# Patient Record
Sex: FEMALE | Race: WHITE | NOT HISPANIC OR LATINO | Employment: FULL TIME
[De-identification: names, ages, dates, MRNs, and addresses within clinical notes are randomized per-mention and may not be internally consistent; named-entity substitution may affect disease eponyms.]

---

## 2017-09-30 ENCOUNTER — HEALTH MAINTENANCE LETTER (OUTPATIENT)
Age: 47
End: 2017-09-30

## 2019-04-17 ENCOUNTER — OFFICE VISIT (OUTPATIENT)
Dept: FAMILY MEDICINE | Facility: CLINIC | Age: 49
End: 2019-04-17
Payer: COMMERCIAL

## 2019-04-17 VITALS
RESPIRATION RATE: 16 BRPM | HEART RATE: 79 BPM | WEIGHT: 152 LBS | TEMPERATURE: 98.7 F | HEIGHT: 65 IN | BODY MASS INDEX: 25.33 KG/M2 | SYSTOLIC BLOOD PRESSURE: 110 MMHG | DIASTOLIC BLOOD PRESSURE: 80 MMHG | OXYGEN SATURATION: 98 %

## 2019-04-17 DIAGNOSIS — Z12.31 VISIT FOR SCREENING MAMMOGRAM: ICD-10-CM

## 2019-04-17 DIAGNOSIS — Z00.00 ENCOUNTER FOR ROUTINE ADULT HEALTH EXAMINATION WITHOUT ABNORMAL FINDINGS: Primary | ICD-10-CM

## 2019-04-17 DIAGNOSIS — Z01.84 IMMUNITY STATUS TESTING: ICD-10-CM

## 2019-04-17 DIAGNOSIS — R07.89 CHEST TIGHTNESS: ICD-10-CM

## 2019-04-17 DIAGNOSIS — R19.5 LOOSE STOOLS: ICD-10-CM

## 2019-04-17 DIAGNOSIS — N92.0 SPOTTING: ICD-10-CM

## 2019-04-17 DIAGNOSIS — M79.671 PAIN IN BOTH FEET: ICD-10-CM

## 2019-04-17 DIAGNOSIS — Z11.4 SCREENING FOR HIV (HUMAN IMMUNODEFICIENCY VIRUS): ICD-10-CM

## 2019-04-17 DIAGNOSIS — M79.672 PAIN IN BOTH FEET: ICD-10-CM

## 2019-04-17 DIAGNOSIS — Z83.79 FAMILY HISTORY OF CELIAC DISEASE: ICD-10-CM

## 2019-04-17 DIAGNOSIS — Z23 NEED FOR PROPHYLACTIC VACCINATION WITH TETANUS-DIPHTHERIA (TD): ICD-10-CM

## 2019-04-17 PROCEDURE — 90715 TDAP VACCINE 7 YRS/> IM: CPT | Performed by: FAMILY MEDICINE

## 2019-04-17 PROCEDURE — 90471 IMMUNIZATION ADMIN: CPT | Performed by: FAMILY MEDICINE

## 2019-04-17 PROCEDURE — 99214 OFFICE O/P EST MOD 30 MIN: CPT | Mod: 25 | Performed by: FAMILY MEDICINE

## 2019-04-17 PROCEDURE — 99386 PREV VISIT NEW AGE 40-64: CPT | Mod: 25 | Performed by: FAMILY MEDICINE

## 2019-04-17 PROCEDURE — 93000 ELECTROCARDIOGRAM COMPLETE: CPT | Performed by: FAMILY MEDICINE

## 2019-04-17 RX ORDER — PIMECROLIMUS 1 %
CREAM (GRAM) TOPICAL
Refills: 0 | COMMUNITY
Start: 2019-04-08 | End: 2019-05-08

## 2019-04-17 ASSESSMENT — MIFFLIN-ST. JEOR: SCORE: 1322.41

## 2019-04-17 NOTE — NURSING NOTE
Screening Questionnaire for Adult Immunization    Are you sick today?   No   Do you have allergies to medications, food, a vaccine component or latex?   No   Have you ever had a serious reaction after receiving a vaccination?   No   Do you have a long-term health problem with heart disease, lung disease, asthma, kidney disease, metabolic disease (e.g. diabetes), anemia, or other blood disorder?   No   Do you have cancer, leukemia, HIV/AIDS, or any other immune system problem?   No   In the past 3 months, have you taken medications that affect  your immune system, such as prednisone, other steroids, or anticancer drugs; drugs for the treatment of rheumatoid arthritis, Crohn s disease, or psoriasis; or have you had radiation treatments?   No   Have you had a seizure, or a brain or other nervous system problem?   No   During the past year, have you received a transfusion of blood or blood     products, or been given immune (gamma) globulin or antiviral drug?   No   For women: Are you pregnant or is there a chance you could become        pregnant during the next month?   No   Have you received any vaccinations in the past 4 weeks?   No     Immunization questionnaire answers were all negative.        Patient instructed to remain in clinic for 15 minutes afterwards, and to report any adverse reaction to me immediately.       Screening performed by Dax Javier on 4/17/2019 at 1:56 PM.

## 2019-04-17 NOTE — PATIENT INSTRUCTIONS
Schedule a lab only visit for fasting labs. You need to be fasting for 10-12 hours.     Please call [facility] (formerly called [facility]) at [phone] to schedule your mammogram. Check to see if your insurance will cover the 3D screening.     The goal for calcium is 1000 mg per day. The calcium in one serving of dairy is about 300 mg. Add up how much calcium you are getting in your diet and supplement the rest.     I recommend [place] take an over-the-counter Vitamin D supplement because we do not get enough sun. Take 1000 international units daily.     Preventive Health Recommendations  Female Ages 40 to 49    Yearly exam:     See your health care provider every year in order to  1. Review health changes.   2. Discuss preventive care.    3. Review your medicines if your doctor prescribed any.      Get a Pap test every three years (unless you have an abnormal result and your provider advises testing more often).      If you get Pap tests with HPV test, you only need to test every 5 years, unless you have an abnormal result. You do not need a Pap test if your uterus was removed (hysterectomy) and you have not had cancer.      You should be tested each year for STDs (sexually transmitted diseases), if you're at risk.     Ask your doctor if you should have a mammogram.      Have a colonoscopy (test for colon cancer) if someone in your family has had colon cancer or polyps before age 50.       Have a cholesterol test every 5 years.       Have a diabetes test (fasting glucose) after age 45. If you are at risk for diabetes, you should have this test every 3 years.    Shots: Get a flu shot each year. Get a tetanus shot every 10 years.     Nutrition:     Eat at least 5 servings of fruits and vegetables each day.    Eat whole-grain bread, whole-wheat pasta and brown rice instead of white grains and rice.    Get adequate Calcium and Vitamin D.      Lifestyle    Exercise  at least 150 minutes a week (an average of 30 minutes a day, 5 days a week). This will help you control your weight and prevent disease.    Limit alcohol to one drink per day.    No smoking.     Wear sunscreen to prevent skin cancer.    See your dentist every six months for an exam and cleaning.

## 2019-04-17 NOTE — LETTER
April 19, 2019      Shireen Bautista  [address] [address]        Dear Shireen,     It was nice to see you in the office recently. I was reviewing your chart after our visit and can't remember if we came to a conclusion on the recent chest pain you were having. The EKG looked great, but if you are continuing to note exertional based pain it would be important to complete a stress test to ensure the pain is not cardiac related.     I've place an order for a stress echo. Please schedule this if you continue to note any exertional pain. This test has you exercise on a recumbent bike and an EKG and echo (heart ultrasound) are completed as you do this to see how your heart responds to stress. The test takes about an hour and you'll need work-out clothes and tennis shoes. Please call [facility] (formerly called [facility]) at [phone] to schedule this test.    Please let me know if you have questions.        Sincerely,        Jaqui Barlow MD

## 2019-04-17 NOTE — PROGRESS NOTES
SUBJECTIVE:   CC: Shireen Bautista is an 48 year old woman who presents for preventive health visit.     Healthy Habits:    Do you get at least three servings of calcium containing foods daily (dairy, green leafy vegetables, etc.)? yes    Amount of exercise or daily activities, outside of work: 0 day(s) per week    Problems taking medications regularly No    Medication side effects: No    Have you had an eye exam in the past two years? yes    Do you see a dentist twice per year? yes    Do you have sleep apnea, excessive snoring or daytime drowsiness?no    Chest tightness:  Patient complains of occasional chest tightness/soreness in central chest. She previously noticed it every few months but it has been more lately. She thought it might be related to caffeine, but it happened the other day when she wasn't drinking caffeine. She had previously been under a lot of work stress but finished a couple projects at work two weeks ago so her stress is reduced.     She is not sure if it is related to eating, but she does experience heart burn occasionally. She thinks she sometimes notices it with exertion because she is deconditioned and does not exercise regularly, but she is not sure she has exerted herself enough to truly determine if it is exertional.     She notices some tachycardia but denies palpitations, wheezing, coughing. No history of asthma     GI:  -After eating fried foods patient has diarrhea, but notes throughout her life she has had loose stools. She has not had any abdominal pain or discomfort  -Patient's daughter has Celiac disease but patient had a negative test   -Complains of rectal skin tags that have become more irritating overtime. She thinks they started during pregnancy      Menstrual cycle:  -Patient's periods are pretty regular, but she has more spotting after her period than she used to. She has up to 10 days of light spotting  -For contraception she had a tubal ligation with her last       Foot pain:  Patient complains of pain in her feet when walking barefoot and thinks she has plantar fasciitis. As she has gotten older she has pain in her bilateral heels when walking long distances. She would like to see podiatry.       Today's PHQ-2 Score:   PHQ-2 (  [employer]) 2019   Q1: Little interest or pleasure in doing things 0 0   Q2: Feeling down, depressed or hopeless 0 0   PHQ-2 Score 0 0       Abuse: Current or Past(Physical, Sexual or Emotional)- No  Do you feel safe in your environment? Yes    Social History     Tobacco Use     Smoking status: Never Smoker     Smokeless tobacco: Never Used   Substance Use Topics     Alcohol use: Yes     If you drink alcohol do you typically have >3 drinks per day or >7 drinks per week? 2 drinks weekly                      Reviewed orders with patient.  Reviewed health maintenance and updated orders accordingly - Yes  Patient Active Problem List   Diagnosis     AD (atopic dermatitis)     Past Surgical History:   Procedure Laterality Date     ABDOMINOPLASTY  2012      SECTION  , ,      LAPAROSCOPY DIAGNOSTIC (GENERAL)  2004, ?2007    endmetriosis       Social History     Tobacco Use     Smoking status: Never Smoker     Smokeless tobacco: Never Used   Substance Use Topics     Alcohol use: Yes     Family History   Problem Relation Age of Onset     Diabetes Father      Lipids Father      Cardiovascular Maternal Grandmother         CHF     Unknown/Adopted Maternal Grandfather      Unknown/Adopted Paternal Grandfather      Lipids Mother      Hypertension Mother      Hypertension Brother      Asthma No family hx of      C.A.D. No family hx of      Thyroid Disease No family hx of            Mammogram Screening: Patient under age 50, mutual decision reflected in health maintenance.      Pertinent mammograms are reviewed under the imaging tab.  History of abnormal Pap smear: NO - age 30-65 PAP every 5 years with negative HPV  "co-testing recommended  PAP / HPV 7/30/2014   PAP NIL     Reviewed and updated as needed this visit by clinical staff  Tobacco  Allergies  Meds         Reviewed and updated as needed this visit by Provider            ROS:   ROS: 10 point ROS neg other than the symptoms noted above in the HPI.    This document serves as a record of the services and decisions personally performed by SUE ALTAMIRANO. It was created on his/her behalf by Deanna Petit, a trained medical scribe. The creation of this document is based on the provider's statements to the medical scribe. Deanna Petit, April 17, 2019 12:56 PM  OBJECTIVE:   /80 (BP Location: Right arm, Patient Position: Sitting, Cuff Size: Adult Regular)   Pulse 79   Temp 98.7  F (37.1  C) (Oral)   Resp 16   Ht 1.638 m (5' 4.5\")   Wt 68.9 kg (152 lb)   SpO2 98%   BMI 25.69 kg/m    EXAM:  GENERAL: healthy, alert and no distress  EYES: Eyes grossly normal to inspection, PERRL and conjunctivae and sclerae normal  HENT: ear canals and TM's normal, nose and mouth without ulcers or lesions  NECK: no adenopathy, no asymmetry, masses, or scars and thyroid normal to palpation  RESP: lungs clear to auscultation - no rales, rhonchi or wheezes  BREAST: deferred, patient has upcoming visit with OB/GYN  CV: regular rate and rhythm, normal S1 S2, no S3 or S4, no murmur, click or rub, no peripheral edema and peripheral pulses strong  ABDOMEN: soft, nontender, no hepatosplenomegaly, no masses and bowel sounds normal   (female): deferred, patient has upcoming visit with OB/GYN  MS: no gross musculoskeletal defects noted, no edema  SKIN: no suspicious lesions or rashes  NEURO: Normal strength and tone, mentation intact and speech normal  PSYCH: mentation appears normal, affect normal/bright    EKG - appears normal, NSR, normal axis, normal intervals, no acute ST/T changes c/w ischemia, no LVH by voltage criteria, there are no prior tracings " "available  ASSESSMENT/PLAN:   1. Encounter for routine adult health examination without abnormal findings  - GLUCOSE; Future  - Lipid panel reflex to direct LDL Fasting; Future    2. Visit for screening mammogram  - MA SCREENING DIGITAL BILAT - Future  (s+30); Future    3. Screening for HIV (human immunodeficiency virus)  - HIV Screening; Future    4. Need for prophylactic vaccination with tetanus-diphtheria (Td)    5. Spotting  Instructed patient to discuss with OB/GYN at upcoming appt  - CBC with platelets differential; Future  - Ferritin; Future  - TSH with free T4 reflex; Future    6. Immunity status testing  - Hepatitis B Surface Antibody; Future    7. Loose stools  8. Family history of celiac disease  Given family history and persistence of loose stools, would like patient to see GI   - GASTROENTEROLOGY ADULT REF CONSULT ONLY  - **Comprehensive metabolic panel FUTURE anytime; Future    9. Chest tightness  Unclear etiology of chest pain. Some ? Of exertional component but patient is not sure. ekg is unremarkable. Needs stress testing if exertional pain is noted. Referral placed.   - EKG 12-lead complete w/read - Clinics    10. Pain in both feet  Patient will schedule with podiatry if pain is not improving with supportive shoes and home stretches  - ORTHO  REFERRAL    COUNSELING:   Reviewed preventive health counseling, as reflected in patient instructions  Special attention given to:        Regular exercise       Healthy diet/nutrition       Vision screening       Hearing screening       Alcohol Use       Contraception       Family planning       Colon cancer screening       HIV screeninx in teen years, 1x in adult years, and at intervals if high risk    BP Readings from Last 1 Encounters:   19 110/80     Estimated body mass index is 25.69 kg/m  as calculated from the following:    Height as of this encounter: 1.638 m (5' 4.5\").    Weight as of this encounter: 68.9 kg (152 lb).      Weight " management plan: Discussed healthy diet and exercise guidelines     reports that she has never smoked. She has never used smokeless tobacco.    Patient Instructions   Schedule a lab only visit for fasting labs. You need to be fasting for 10-12 hours.     Please call [facility] (formerly called [facility]) at [phone] to schedule your mammogram. Check to see if your insurance will cover the 3D screening.     The goal for calcium is 1000 mg per day. The calcium in one serving of dairy is about 300 mg. Add up how much calcium you are getting in your diet and supplement the rest.     I recommend [facility] take an over-the-counter Vitamin D supplement because we do not get enough sun. Take 1000 international units daily.     Preventive Health Recommendations  Female Ages 40 to 49    Yearly exam:     See your health care provider every year in order to  1. Review health changes.   2. Discuss preventive care.    3. Review your medicines if your doctor prescribed any.      Get a Pap test every three years (unless you have an abnormal result and your provider advises testing more often).      If you get Pap tests with HPV test, you only need to test every 5 years, unless you have an abnormal result. You do not need a Pap test if your uterus was removed (hysterectomy) and you have not had cancer.      You should be tested each year for STDs (sexually transmitted diseases), if you're at risk.     Ask your doctor if you should have a mammogram.      Have a colonoscopy (test for colon cancer) if someone in your family has had colon cancer or polyps before age 50.       Have a cholesterol test every 5 years.       Have a diabetes test (fasting glucose) after age 45. If you are at risk for diabetes, you should have this test every 3 years.    Shots: Get a flu shot each year. Get a tetanus shot every 10 years.     Nutrition:     Eat at least 5 servings of fruits and vegetables  each day.    Eat whole-grain bread, whole-wheat pasta and brown rice instead of white grains and rice.    Get adequate Calcium and Vitamin D.      Lifestyle    Exercise at least 150 minutes a week (an average of 30 minutes a day, 5 days a week). This will help you control your weight and prevent disease.    Limit alcohol to one drink per day.    No smoking.     Wear sunscreen to prevent skin cancer.    See your dentist every six months for an exam and cleaning.      Counseling Resources:  ATP IV Guidelines  Pooled Cohorts Equation Calculator  Breast Cancer Risk Calculator  FRAX Risk Assessment  ICSI Preventive Guidelines  Dietary Guidelines for Americans, 2010  USDA's MyPlate  ASA Prophylaxis  Lung CA Screening    The information in this document, created by the medical scribe for me, accurately reflects the services I personally performed and the decisions made by me. I have reviewed and approved this document for accuracy.   Jaqui Barlow MD  [facility]

## 2019-04-29 ENCOUNTER — TELEPHONE (OUTPATIENT)
Dept: PODIATRY | Facility: CLINIC | Age: 49
End: 2019-04-29

## 2019-04-29 NOTE — TELEPHONE ENCOUNTER
4/29 called and left voicemail. Explained Dr. Kelly will not be in the clinic wed may 1st please call [phone] to reschedule this appointment.     Maria Alejandra Palma          Procedure    Ortho/Sports Med/Ent/Eye   [facility]   [phone]

## 2019-05-08 ENCOUNTER — ANESTHESIA (OUTPATIENT)
Dept: SURGERY | Facility: CLINIC | Age: 49
End: 2019-05-08
Payer: COMMERCIAL

## 2019-05-08 ENCOUNTER — ANESTHESIA EVENT (OUTPATIENT)
Dept: SURGERY | Facility: CLINIC | Age: 49
End: 2019-05-08
Payer: COMMERCIAL

## 2019-05-08 ENCOUNTER — HOSPITAL ENCOUNTER (OUTPATIENT)
Facility: CLINIC | Age: 49
Discharge: HOME | End: 2019-05-09
Attending: EMERGENCY MEDICINE | Admitting: SURGERY
Payer: COMMERCIAL

## 2019-05-08 ENCOUNTER — APPOINTMENT (OUTPATIENT)
Dept: ULTRASOUND IMAGING | Facility: CLINIC | Age: 49
End: 2019-05-08
Attending: EMERGENCY MEDICINE
Payer: COMMERCIAL

## 2019-05-08 DIAGNOSIS — G89.18 ACUTE POST-OPERATIVE PAIN: Primary | ICD-10-CM

## 2019-05-08 DIAGNOSIS — K81.0 ACUTE CHOLECYSTITIS: ICD-10-CM

## 2019-05-08 PROBLEM — K81.9 CHOLECYSTITIS: Status: ACTIVE | Noted: 2019-05-08

## 2019-05-08 LAB
ALBUMIN SERPL-MCNC: 4 G/DL (ref 3.4–5)
ALBUMIN UR-MCNC: 30 MG/DL
ALP SERPL-CCNC: 81 U/L (ref 40–150)
ALT SERPL W P-5'-P-CCNC: 86 U/L (ref 0–50)
ANION GAP SERPL CALCULATED.3IONS-SCNC: 9 MMOL/L (ref 3–14)
APPEARANCE UR: ABNORMAL
AST SERPL W P-5'-P-CCNC: 49 U/L (ref 0–45)
BACTERIA #/AREA URNS HPF: ABNORMAL /HPF
BASOPHILS # BLD AUTO: 0 10E9/L (ref 0–0.2)
BASOPHILS NFR BLD AUTO: 0.1 %
BILIRUB SERPL-MCNC: 0.8 MG/DL (ref 0.2–1.3)
BILIRUB UR QL STRIP: NEGATIVE
BUN SERPL-MCNC: 11 MG/DL (ref 7–30)
CALCIUM SERPL-MCNC: 9.2 MG/DL (ref 8.5–10.1)
CHLORIDE SERPL-SCNC: 107 MMOL/L (ref 94–109)
CO2 SERPL-SCNC: 23 MMOL/L (ref 20–32)
COLOR UR AUTO: YELLOW
CREAT SERPL-MCNC: 0.9 MG/DL (ref 0.52–1.04)
DIFFERENTIAL METHOD BLD: NORMAL
EOSINOPHIL # BLD AUTO: 0.1 10E9/L (ref 0–0.7)
EOSINOPHIL NFR BLD AUTO: 1.5 %
ERYTHROCYTE [DISTWIDTH] IN BLOOD BY AUTOMATED COUNT: 13.2 % (ref 10–15)
GFR SERPL CREATININE-BSD FRML MDRD: 76 ML/MIN/{1.73_M2}
GLUCOSE SERPL-MCNC: 87 MG/DL (ref 70–99)
GLUCOSE UR STRIP-MCNC: NEGATIVE MG/DL
HCG UR QL: NEGATIVE
HCT VFR BLD AUTO: 40.1 % (ref 35–47)
HGB BLD-MCNC: 13.4 G/DL (ref 11.7–15.7)
HGB UR QL STRIP: NEGATIVE
IMM GRANULOCYTES # BLD: 0 10E9/L (ref 0–0.4)
IMM GRANULOCYTES NFR BLD: 0.4 %
KETONES UR STRIP-MCNC: NEGATIVE MG/DL
LEUKOCYTE ESTERASE UR QL STRIP: NEGATIVE
LIPASE SERPL-CCNC: 82 U/L (ref 73–393)
LYMPHOCYTES # BLD AUTO: 1.1 10E9/L (ref 0.8–5.3)
LYMPHOCYTES NFR BLD AUTO: 13.6 %
MCH RBC QN AUTO: 30.4 PG (ref 26.5–33)
MCHC RBC AUTO-ENTMCNC: 33.4 G/DL (ref 31.5–36.5)
MCV RBC AUTO: 91 FL (ref 78–100)
MONOCYTES # BLD AUTO: 0.5 10E9/L (ref 0–1.3)
MONOCYTES NFR BLD AUTO: 6 %
MUCOUS THREADS #/AREA URNS LPF: PRESENT /LPF
NEUTROPHILS # BLD AUTO: 6.3 10E9/L (ref 1.6–8.3)
NEUTROPHILS NFR BLD AUTO: 78.4 %
NITRATE UR QL: NEGATIVE
NRBC # BLD AUTO: 0 10*3/UL
NRBC BLD AUTO-RTO: 0 /100
PH UR STRIP: 5.5 PH (ref 5–7)
PLATELET # BLD AUTO: 249 10E9/L (ref 150–450)
POTASSIUM SERPL-SCNC: 3.5 MMOL/L (ref 3.4–5.3)
PROT SERPL-MCNC: 7.5 G/DL (ref 6.8–8.8)
RBC # BLD AUTO: 4.41 10E12/L (ref 3.8–5.2)
RBC #/AREA URNS AUTO: 1 /HPF (ref 0–2)
SODIUM SERPL-SCNC: 139 MMOL/L (ref 133–144)
SOURCE: ABNORMAL
SP GR UR STRIP: 1.02 (ref 1–1.03)
SQUAMOUS #/AREA URNS AUTO: 4 /HPF (ref 0–1)
UROBILINOGEN UR STRIP-MCNC: NORMAL MG/DL (ref 0–2)
WBC # BLD AUTO: 8 10E9/L (ref 4–11)
WBC #/AREA URNS AUTO: 3 /HPF (ref 0–5)

## 2019-05-08 PROCEDURE — 88304 TISSUE EXAM BY PATHOLOGIST: CPT | Performed by: SURGERY

## 2019-05-08 PROCEDURE — 25000125 ZZHC RX 250: Performed by: NURSE ANESTHETIST, CERTIFIED REGISTERED

## 2019-05-08 PROCEDURE — G0378 HOSPITAL OBSERVATION PER HR: HCPCS

## 2019-05-08 PROCEDURE — 36000058 ZZH SURGERY LEVEL 3 EA 15 ADDTL MIN: Performed by: SURGERY

## 2019-05-08 PROCEDURE — 25800030 ZZH RX IP 258 OP 636: Performed by: NURSE ANESTHETIST, CERTIFIED REGISTERED

## 2019-05-08 PROCEDURE — 81025 URINE PREGNANCY TEST: CPT | Performed by: EMERGENCY MEDICINE

## 2019-05-08 PROCEDURE — 83690 ASSAY OF LIPASE: CPT | Performed by: EMERGENCY MEDICINE

## 2019-05-08 PROCEDURE — 76705 ECHO EXAM OF ABDOMEN: CPT

## 2019-05-08 PROCEDURE — 99204 OFFICE O/P NEW MOD 45 MIN: CPT | Mod: 57 | Performed by: SURGERY

## 2019-05-08 PROCEDURE — 80053 COMPREHEN METABOLIC PANEL: CPT | Performed by: EMERGENCY MEDICINE

## 2019-05-08 PROCEDURE — 47562 LAPAROSCOPIC CHOLECYSTECTOMY: CPT | Performed by: SURGERY

## 2019-05-08 PROCEDURE — 96374 THER/PROPH/DIAG INJ IV PUSH: CPT

## 2019-05-08 PROCEDURE — 25000132 ZZH RX MED GY IP 250 OP 250 PS 637: Performed by: STUDENT IN AN ORGANIZED HEALTH CARE EDUCATION/TRAINING PROGRAM

## 2019-05-08 PROCEDURE — 25000566 ZZH SEVOFLURANE, EA 15 MIN: Performed by: SURGERY

## 2019-05-08 PROCEDURE — 25000128 H RX IP 250 OP 636: Performed by: EMERGENCY MEDICINE

## 2019-05-08 PROCEDURE — 36000056 ZZH SURGERY LEVEL 3 1ST 30 MIN: Performed by: SURGERY

## 2019-05-08 PROCEDURE — 40000169 ZZH STATISTIC PRE-PROCEDURE ASSESSMENT I: Performed by: SURGERY

## 2019-05-08 PROCEDURE — 37000008 ZZH ANESTHESIA TECHNICAL FEE, 1ST 30 MIN: Performed by: SURGERY

## 2019-05-08 PROCEDURE — 25000128 H RX IP 250 OP 636: Performed by: NURSE ANESTHETIST, CERTIFIED REGISTERED

## 2019-05-08 PROCEDURE — 71000012 ZZH RECOVERY PHASE 1 LEVEL 1 FIRST HR: Performed by: SURGERY

## 2019-05-08 PROCEDURE — 25000128 H RX IP 250 OP 636: Performed by: SURGERY

## 2019-05-08 PROCEDURE — 25000128 H RX IP 250 OP 636: Performed by: ANESTHESIOLOGY

## 2019-05-08 PROCEDURE — 27210794 ZZH OR GENERAL SUPPLY STERILE: Performed by: SURGERY

## 2019-05-08 PROCEDURE — 25000125 ZZHC RX 250: Performed by: SURGERY

## 2019-05-08 PROCEDURE — 25800030 ZZH RX IP 258 OP 636: Performed by: ANESTHESIOLOGY

## 2019-05-08 PROCEDURE — 85025 COMPLETE CBC W/AUTO DIFF WBC: CPT | Performed by: EMERGENCY MEDICINE

## 2019-05-08 PROCEDURE — 96361 HYDRATE IV INFUSION ADD-ON: CPT

## 2019-05-08 PROCEDURE — 37000009 ZZH ANESTHESIA TECHNICAL FEE, EACH ADDTL 15 MIN: Performed by: SURGERY

## 2019-05-08 PROCEDURE — 81001 URINALYSIS AUTO W/SCOPE: CPT | Performed by: EMERGENCY MEDICINE

## 2019-05-08 PROCEDURE — 25800030 ZZH RX IP 258 OP 636: Performed by: SURGERY

## 2019-05-08 PROCEDURE — 99285 EMERGENCY DEPT VISIT HI MDM: CPT | Mod: 25

## 2019-05-08 RX ORDER — HYDROMORPHONE HYDROCHLORIDE 1 MG/ML
0.5 INJECTION, SOLUTION INTRAMUSCULAR; INTRAVENOUS; SUBCUTANEOUS
Status: DISCONTINUED | OUTPATIENT
Start: 2019-05-08 | End: 2019-05-08

## 2019-05-08 RX ORDER — PROPOFOL 10 MG/ML
INJECTION, EMULSION INTRAVENOUS PRN
Status: DISCONTINUED | OUTPATIENT
Start: 2019-05-08 | End: 2019-05-08

## 2019-05-08 RX ORDER — FENTANYL CITRATE 50 UG/ML
25-50 INJECTION, SOLUTION INTRAMUSCULAR; INTRAVENOUS
Status: DISCONTINUED | OUTPATIENT
Start: 2019-05-08 | End: 2019-05-08 | Stop reason: HOSPADM

## 2019-05-08 RX ORDER — LABETALOL 20 MG/4 ML (5 MG/ML) INTRAVENOUS SYRINGE
10
Status: DISCONTINUED | OUTPATIENT
Start: 2019-05-08 | End: 2019-05-08 | Stop reason: HOSPADM

## 2019-05-08 RX ORDER — SODIUM CHLORIDE, SODIUM LACTATE, POTASSIUM CHLORIDE, CALCIUM CHLORIDE 600; 310; 30; 20 MG/100ML; MG/100ML; MG/100ML; MG/100ML
INJECTION, SOLUTION INTRAVENOUS CONTINUOUS
Status: DISCONTINUED | OUTPATIENT
Start: 2019-05-08 | End: 2019-05-08 | Stop reason: HOSPADM

## 2019-05-08 RX ORDER — ONDANSETRON 4 MG/1
4 TABLET, ORALLY DISINTEGRATING ORAL EVERY 6 HOURS PRN
Status: DISCONTINUED | OUTPATIENT
Start: 2019-05-08 | End: 2019-05-09 | Stop reason: HOSPADM

## 2019-05-08 RX ORDER — SODIUM CHLORIDE, SODIUM LACTATE, POTASSIUM CHLORIDE, CALCIUM CHLORIDE 600; 310; 30; 20 MG/100ML; MG/100ML; MG/100ML; MG/100ML
INJECTION, SOLUTION INTRAVENOUS CONTINUOUS PRN
Status: DISCONTINUED | OUTPATIENT
Start: 2019-05-08 | End: 2019-05-08

## 2019-05-08 RX ORDER — LIDOCAINE 40 MG/G
CREAM TOPICAL
Status: DISCONTINUED | OUTPATIENT
Start: 2019-05-08 | End: 2019-05-09 | Stop reason: HOSPADM

## 2019-05-08 RX ORDER — ONDANSETRON 2 MG/ML
4 INJECTION INTRAMUSCULAR; INTRAVENOUS EVERY 6 HOURS PRN
Status: DISCONTINUED | OUTPATIENT
Start: 2019-05-08 | End: 2019-05-08

## 2019-05-08 RX ORDER — KETOROLAC TROMETHAMINE 30 MG/ML
30 INJECTION, SOLUTION INTRAMUSCULAR; INTRAVENOUS
Status: COMPLETED | OUTPATIENT
Start: 2019-05-08 | End: 2019-05-08

## 2019-05-08 RX ORDER — CEFAZOLIN SODIUM 2 G/100ML
2 INJECTION, SOLUTION INTRAVENOUS
Status: COMPLETED | OUTPATIENT
Start: 2019-05-08 | End: 2019-05-08

## 2019-05-08 RX ORDER — KETOROLAC TROMETHAMINE 15 MG/ML
15 INJECTION, SOLUTION INTRAMUSCULAR; INTRAVENOUS ONCE
Status: COMPLETED | OUTPATIENT
Start: 2019-05-08 | End: 2019-05-08

## 2019-05-08 RX ORDER — PROCHLORPERAZINE MALEATE 10 MG
10 TABLET ORAL EVERY 6 HOURS PRN
Status: DISCONTINUED | OUTPATIENT
Start: 2019-05-08 | End: 2019-05-09 | Stop reason: HOSPADM

## 2019-05-08 RX ORDER — LIDOCAINE HYDROCHLORIDE 20 MG/ML
INJECTION, SOLUTION INFILTRATION; PERINEURAL PRN
Status: DISCONTINUED | OUTPATIENT
Start: 2019-05-08 | End: 2019-05-08

## 2019-05-08 RX ORDER — NEOSTIGMINE METHYLSULFATE 1 MG/ML
VIAL (ML) INJECTION PRN
Status: DISCONTINUED | OUTPATIENT
Start: 2019-05-08 | End: 2019-05-08

## 2019-05-08 RX ORDER — HYDROCODONE BITARTRATE AND ACETAMINOPHEN 5; 325 MG/1; MG/1
1-2 TABLET ORAL EVERY 4 HOURS PRN
Status: DISCONTINUED | OUTPATIENT
Start: 2019-05-08 | End: 2019-05-09 | Stop reason: HOSPADM

## 2019-05-08 RX ORDER — HYDROCODONE BITARTRATE AND ACETAMINOPHEN 5; 325 MG/1; MG/1
1-2 TABLET ORAL EVERY 4 HOURS PRN
Qty: 20 TABLET | Refills: 0 | Status: SHIPPED | OUTPATIENT
Start: 2019-05-08 | End: 2023-03-16

## 2019-05-08 RX ORDER — NALOXONE HYDROCHLORIDE 0.4 MG/ML
.1-.4 INJECTION, SOLUTION INTRAMUSCULAR; INTRAVENOUS; SUBCUTANEOUS
Status: DISCONTINUED | OUTPATIENT
Start: 2019-05-08 | End: 2019-05-09 | Stop reason: HOSPADM

## 2019-05-08 RX ORDER — NALOXONE HYDROCHLORIDE 0.4 MG/ML
.1-.4 INJECTION, SOLUTION INTRAMUSCULAR; INTRAVENOUS; SUBCUTANEOUS
Status: DISCONTINUED | OUTPATIENT
Start: 2019-05-08 | End: 2019-05-08

## 2019-05-08 RX ORDER — ACETAMINOPHEN 325 MG/1
650 TABLET ORAL EVERY 4 HOURS PRN
Status: DISCONTINUED | OUTPATIENT
Start: 2019-05-08 | End: 2019-05-09 | Stop reason: HOSPADM

## 2019-05-08 RX ORDER — ONDANSETRON 2 MG/ML
4 INJECTION INTRAMUSCULAR; INTRAVENOUS EVERY 6 HOURS PRN
Status: DISCONTINUED | OUTPATIENT
Start: 2019-05-08 | End: 2019-05-09 | Stop reason: HOSPADM

## 2019-05-08 RX ORDER — SODIUM CHLORIDE 9 MG/ML
1000 INJECTION, SOLUTION INTRAVENOUS CONTINUOUS
Status: DISCONTINUED | OUTPATIENT
Start: 2019-05-08 | End: 2019-05-08

## 2019-05-08 RX ORDER — SODIUM CHLORIDE 9 MG/ML
INJECTION, SOLUTION INTRAVENOUS CONTINUOUS
Status: DISCONTINUED | OUTPATIENT
Start: 2019-05-08 | End: 2019-05-09

## 2019-05-08 RX ORDER — DEXAMETHASONE SODIUM PHOSPHATE 4 MG/ML
INJECTION, SOLUTION INTRA-ARTICULAR; INTRALESIONAL; INTRAMUSCULAR; INTRAVENOUS; SOFT TISSUE PRN
Status: DISCONTINUED | OUTPATIENT
Start: 2019-05-08 | End: 2019-05-08

## 2019-05-08 RX ORDER — PIMECROLIMUS 10 MG/G
CREAM TOPICAL 2 TIMES DAILY PRN
COMMUNITY
End: 2023-03-16

## 2019-05-08 RX ORDER — CEFAZOLIN SODIUM 1 G/3ML
1 INJECTION, POWDER, FOR SOLUTION INTRAMUSCULAR; INTRAVENOUS SEE ADMIN INSTRUCTIONS
Status: DISCONTINUED | OUTPATIENT
Start: 2019-05-08 | End: 2019-05-08 | Stop reason: HOSPADM

## 2019-05-08 RX ORDER — CETIRIZINE HYDROCHLORIDE 10 MG/1
10 TABLET ORAL DAILY PRN
COMMUNITY

## 2019-05-08 RX ORDER — HYDROMORPHONE HYDROCHLORIDE 1 MG/ML
0.2 INJECTION, SOLUTION INTRAMUSCULAR; INTRAVENOUS; SUBCUTANEOUS
Status: DISCONTINUED | OUTPATIENT
Start: 2019-05-08 | End: 2019-05-09

## 2019-05-08 RX ORDER — HYDROMORPHONE HYDROCHLORIDE 1 MG/ML
.3-.5 INJECTION, SOLUTION INTRAMUSCULAR; INTRAVENOUS; SUBCUTANEOUS EVERY 5 MIN PRN
Status: DISCONTINUED | OUTPATIENT
Start: 2019-05-08 | End: 2019-05-08 | Stop reason: HOSPADM

## 2019-05-08 RX ORDER — ONDANSETRON 2 MG/ML
4 INJECTION INTRAMUSCULAR; INTRAVENOUS EVERY 30 MIN PRN
Status: DISCONTINUED | OUTPATIENT
Start: 2019-05-08 | End: 2019-05-08 | Stop reason: HOSPADM

## 2019-05-08 RX ORDER — FENTANYL CITRATE 50 UG/ML
25-100 INJECTION, SOLUTION INTRAMUSCULAR; INTRAVENOUS
Status: COMPLETED | OUTPATIENT
Start: 2019-05-08 | End: 2019-05-08

## 2019-05-08 RX ORDER — PIPERACILLIN SODIUM, TAZOBACTAM SODIUM 3; .375 G/15ML; G/15ML
3.38 INJECTION, POWDER, LYOPHILIZED, FOR SOLUTION INTRAVENOUS ONCE
Status: COMPLETED | OUTPATIENT
Start: 2019-05-08 | End: 2019-05-08

## 2019-05-08 RX ORDER — ONDANSETRON 4 MG/1
4 TABLET, ORALLY DISINTEGRATING ORAL EVERY 6 HOURS PRN
Status: DISCONTINUED | OUTPATIENT
Start: 2019-05-08 | End: 2019-05-08

## 2019-05-08 RX ORDER — ONDANSETRON 4 MG/1
4 TABLET, ORALLY DISINTEGRATING ORAL EVERY 30 MIN PRN
Status: DISCONTINUED | OUTPATIENT
Start: 2019-05-08 | End: 2019-05-08 | Stop reason: HOSPADM

## 2019-05-08 RX ORDER — GLYCOPYRROLATE 0.2 MG/ML
INJECTION, SOLUTION INTRAMUSCULAR; INTRAVENOUS PRN
Status: DISCONTINUED | OUTPATIENT
Start: 2019-05-08 | End: 2019-05-08

## 2019-05-08 RX ORDER — ACETAMINOPHEN 650 MG/1
650 SUPPOSITORY RECTAL EVERY 4 HOURS PRN
Status: DISCONTINUED | OUTPATIENT
Start: 2019-05-08 | End: 2019-05-09 | Stop reason: HOSPADM

## 2019-05-08 RX ORDER — SODIUM CHLORIDE, SODIUM LACTATE, POTASSIUM CHLORIDE, CALCIUM CHLORIDE 600; 310; 30; 20 MG/100ML; MG/100ML; MG/100ML; MG/100ML
500 INJECTION, SOLUTION INTRAVENOUS CONTINUOUS
Status: DISCONTINUED | OUTPATIENT
Start: 2019-05-08 | End: 2019-05-08 | Stop reason: HOSPADM

## 2019-05-08 RX ORDER — SODIUM CHLORIDE, SODIUM LACTATE, POTASSIUM CHLORIDE, CALCIUM CHLORIDE 600; 310; 30; 20 MG/100ML; MG/100ML; MG/100ML; MG/100ML
INJECTION, SOLUTION INTRAVENOUS CONTINUOUS
Status: DISCONTINUED | OUTPATIENT
Start: 2019-05-08 | End: 2019-05-09

## 2019-05-08 RX ORDER — PROPOFOL 10 MG/ML
INJECTION, EMULSION INTRAVENOUS CONTINUOUS PRN
Status: DISCONTINUED | OUTPATIENT
Start: 2019-05-08 | End: 2019-05-08

## 2019-05-08 RX ORDER — PROCHLORPERAZINE 25 MG
25 SUPPOSITORY, RECTAL RECTAL EVERY 12 HOURS PRN
Status: DISCONTINUED | OUTPATIENT
Start: 2019-05-08 | End: 2019-05-09 | Stop reason: HOSPADM

## 2019-05-08 RX ORDER — ONDANSETRON 2 MG/ML
4 INJECTION INTRAMUSCULAR; INTRAVENOUS
Status: COMPLETED | OUTPATIENT
Start: 2019-05-08 | End: 2019-05-08

## 2019-05-08 RX ADMIN — PHENYLEPHRINE HYDROCHLORIDE 50 MCG: 10 INJECTION, SOLUTION INTRAMUSCULAR; INTRAVENOUS; SUBCUTANEOUS at 18:25

## 2019-05-08 RX ADMIN — SODIUM CHLORIDE, POTASSIUM CHLORIDE, SODIUM LACTATE AND CALCIUM CHLORIDE 1000 ML: 600; 310; 30; 20 INJECTION, SOLUTION INTRAVENOUS at 18:09

## 2019-05-08 RX ADMIN — FENTANYL CITRATE 50 MCG: 50 INJECTION, SOLUTION INTRAMUSCULAR; INTRAVENOUS at 20:02

## 2019-05-08 RX ADMIN — PIPERACILLIN SODIUM,TAZOBACTAM SODIUM 3.38 G: 3; .375 INJECTION, POWDER, FOR SOLUTION INTRAVENOUS at 13:17

## 2019-05-08 RX ADMIN — DEXAMETHASONE SODIUM PHOSPHATE 4 MG: 4 INJECTION, SOLUTION INTRA-ARTICULAR; INTRALESIONAL; INTRAMUSCULAR; INTRAVENOUS; SOFT TISSUE at 18:15

## 2019-05-08 RX ADMIN — SODIUM CHLORIDE: 9 INJECTION, SOLUTION INTRAVENOUS at 21:47

## 2019-05-08 RX ADMIN — PROPOFOL 150 MCG/KG/MIN: 10 INJECTION, EMULSION INTRAVENOUS at 18:15

## 2019-05-08 RX ADMIN — KETOROLAC TROMETHAMINE 15 MG: 15 INJECTION, SOLUTION INTRAMUSCULAR; INTRAVENOUS at 11:29

## 2019-05-08 RX ADMIN — FENTANYL CITRATE 100 MCG: 50 INJECTION, SOLUTION INTRAMUSCULAR; INTRAVENOUS at 18:31

## 2019-05-08 RX ADMIN — ONDANSETRON 4 MG: 2 INJECTION INTRAMUSCULAR; INTRAVENOUS at 18:07

## 2019-05-08 RX ADMIN — SODIUM CHLORIDE, POTASSIUM CHLORIDE, SODIUM LACTATE AND CALCIUM CHLORIDE: 600; 310; 30; 20 INJECTION, SOLUTION INTRAVENOUS at 18:06

## 2019-05-08 RX ADMIN — KETOROLAC TROMETHAMINE 30 MG: 30 INJECTION, SOLUTION INTRAMUSCULAR; INTRAVENOUS at 20:07

## 2019-05-08 RX ADMIN — MIDAZOLAM 2 MG: 1 INJECTION INTRAMUSCULAR; INTRAVENOUS at 18:07

## 2019-05-08 RX ADMIN — HYDROCODONE BITARTRATE AND ACETAMINOPHEN 1 TABLET: 5; 325 TABLET ORAL at 23:24

## 2019-05-08 RX ADMIN — FENTANYL CITRATE 50 MCG: 50 INJECTION, SOLUTION INTRAMUSCULAR; INTRAVENOUS at 18:13

## 2019-05-08 RX ADMIN — SODIUM CHLORIDE: 9 INJECTION, SOLUTION INTRAVENOUS at 13:41

## 2019-05-08 RX ADMIN — SODIUM CHLORIDE 1000 ML: 9 INJECTION, SOLUTION INTRAVENOUS at 11:29

## 2019-05-08 RX ADMIN — PROPOFOL 200 MG: 10 INJECTION, EMULSION INTRAVENOUS at 18:14

## 2019-05-08 RX ADMIN — GLYCOPYRROLATE 0.4 MG: 0.2 INJECTION, SOLUTION INTRAMUSCULAR; INTRAVENOUS at 19:24

## 2019-05-08 RX ADMIN — FENTANYL CITRATE 50 MCG: 50 INJECTION, SOLUTION INTRAMUSCULAR; INTRAVENOUS at 18:39

## 2019-05-08 RX ADMIN — CEFAZOLIN SODIUM 2 G: 2 INJECTION, SOLUTION INTRAVENOUS at 18:19

## 2019-05-08 RX ADMIN — ROCURONIUM BROMIDE 10 MG: 10 INJECTION INTRAVENOUS at 18:36

## 2019-05-08 RX ADMIN — ONDANSETRON 4 MG: 2 INJECTION INTRAMUSCULAR; INTRAVENOUS at 20:15

## 2019-05-08 RX ADMIN — SUCCINYLCHOLINE CHLORIDE 100 MG: 20 INJECTION, SOLUTION INTRAMUSCULAR; INTRAVENOUS; PARENTERAL at 18:15

## 2019-05-08 RX ADMIN — SODIUM CHLORIDE, POTASSIUM CHLORIDE, SODIUM LACTATE AND CALCIUM CHLORIDE: 600; 310; 30; 20 INJECTION, SOLUTION INTRAVENOUS at 20:08

## 2019-05-08 RX ADMIN — PHENYLEPHRINE HYDROCHLORIDE 50 MCG: 10 INJECTION, SOLUTION INTRAMUSCULAR; INTRAVENOUS; SUBCUTANEOUS at 18:20

## 2019-05-08 RX ADMIN — NEOSTIGMINE METHYLSULFATE 3 MG: 1 INJECTION, SOLUTION INTRAVENOUS at 19:24

## 2019-05-08 RX ADMIN — ROCURONIUM BROMIDE 20 MG: 10 INJECTION INTRAVENOUS at 18:21

## 2019-05-08 RX ADMIN — LIDOCAINE HYDROCHLORIDE 100 MG: 20 INJECTION, SOLUTION INFILTRATION; PERINEURAL at 18:07

## 2019-05-08 ASSESSMENT — MIFFLIN-ST. JEOR
SCORE: 1319
SCORE: 1305.4

## 2019-05-08 ASSESSMENT — ENCOUNTER SYMPTOMS
FEVER: 1
DIARRHEA: 1
ABDOMINAL PAIN: 1
VOMITING: 0
CONSTIPATION: 0
APPETITE CHANGE: 1
BLOOD IN STOOL: 0
NAUSEA: 1

## 2019-05-08 NOTE — ED TRIAGE NOTES
Nausea, diarrhea and intermittent fever for the past week. Upper abdominal pain past 2 days. Decreased appetite

## 2019-05-08 NOTE — ANESTHESIA PREPROCEDURE EVALUATION
Anesthesia Pre-Procedure Evaluation    Patient: Shireen Bautista   MRN: [id number] : 1970          Preoperative Diagnosis: UNKNOWN    Procedure(s):  CHOLECYSTECTOMY, LAPAROSCOPIC    History reviewed. No pertinent past medical history.  Past Surgical History:   Procedure Laterality Date     ABDOMINOPLASTY  2012      SECTION  2005, 2008,      LAPAROSCOPY DIAGNOSTIC (GENERAL)  2004, ?2007    endmetriosis     TUBAL LIGATION      with c/s       Anesthesia Evaluation     . Pt has had prior anesthetic.     No history of anesthetic complications          ROS/MED HX    ENT/Pulmonary:      (-) sleep apnea   Neurologic:       Cardiovascular:         METS/Exercise Tolerance:     Hematologic:         Musculoskeletal:         GI/Hepatic:     (+) cholecystitis/cholelithiasis,      (-) GERD   Renal/Genitourinary:         Endo:         Psychiatric:         Infectious Disease:         Malignancy:         Other:                          Physical Exam  Normal systems: cardiovascular, pulmonary and dental    Airway   Mallampati: II  TM distance: >3 FB  Neck ROM: full    Dental     Cardiovascular   Rhythm and rate: regular and normal      Pulmonary    breath sounds clear to auscultation            Lab Results   Component Value Date    WBC 8.0 2019    HGB 13.4 2019    HCT 40.1 2019     2019     2019    POTASSIUM 3.5 2019    CHLORIDE 107 2019    CO2 23 2019    BUN 11 2019    CR 0.90 2019    GLC 87 2019    LUCA 9.2 2019    ALBUMIN 4.0 2019    PROTTOTAL 7.5 2019    ALT 86 (H) 2019    AST 49 (H) 2019    ALKPHOS 81 2019    BILITOTAL 0.8 2019    LIPASE 82 2019    TSH 1.05 2014    HCG Negative 2019       Preop Vitals  BP Readings from Last 3 Encounters:   19 131/77   19 110/80   10/30/15 108/80    Pulse Readings from Last 3 Encounters:   19 79   19 79   10/30/15  "70      Resp Readings from Last 3 Encounters:   05/08/19 16   04/17/19 16   10/30/15 16    SpO2 Readings from Last 3 Encounters:   05/08/19 100%   04/17/19 98%   10/30/15 99%      Temp Readings from Last 1 Encounters:   05/08/19 36.5  C (97.7  F) (Oral)    Ht Readings from Last 1 Encounters:   05/08/19 1.626 m (5' 4\")      Wt Readings from Last 1 Encounters:   05/08/19 69.4 kg (153 lb)    Estimated body mass index is 26.26 kg/m  as calculated from the following:    Height as of this encounter: 1.626 m (5' 4\").    Weight as of this encounter: 69.4 kg (153 lb).       Anesthesia Plan      History & Physical Review  History and physical reviewed and following examination; no interval change.    ASA Status:  2 .    NPO Status:  > 8 hours    Plan for General, RSI and ETT with Intravenous induction. Maintenance will be Balanced.    PONV prophylaxis:  Ondansetron (or other 5HT-3) and Dexamethasone or Solumedrol  Propofol gtt, zofran, decadron  Toradol 30mg      Postoperative Care  Postoperative pain management:  IV analgesics.      Consents  Anesthetic plan, risks, benefits and alternatives discussed with:  Patient..                 Jesse Lezama MD  "

## 2019-05-08 NOTE — PHARMACY-ADMISSION MEDICATION HISTORY
Admission medication history interview status for the 5/8/2019  admission is complete. See EPIC admission navigator for prior to admission medications     Medication history source reliability:Good    Actions taken by pharmacist (provider contacted, etc): spoke with patient. Requests allergy eye drops    Additional medication history information not noted on PTA med list :None    Medication reconciliation/reorder completed by provider prior to medication history? No    Time spent in this activity: 10 mins    Prior to Admission medications    Medication Sig Last Dose Taking? Auth Provider   cetirizine (ZYRTEC) 10 MG tablet Take 10 mg by mouth daily as needed for allergies prn Yes Unknown, Entered By History   ketotifen (ZADITOR/REFRESH ANTI-ITCH) 0.025 % ophthalmic solution Place 1 drop into both eyes 2 times daily as needed for itching prn Yes Unknown, Entered By History   pimecrolimus (ELIDEL) 1 % external cream Apply topically 2 times daily as needed (eczema) prn Yes Unknown, Entered By History       Lilia Blue, PharmD

## 2019-05-08 NOTE — PROGRESS NOTES
RECEIVING UNIT ED HANDOFF REVIEW    ED Nurse Handoff Report was reviewed by: Tawanna Pacheco on May 8, 2019 at 3:50 PM

## 2019-05-08 NOTE — ED PROVIDER NOTES
"  History     Chief Complaint:  Abdominal Pain    HPI   Shireen Bautista is an otherwise healthy 48 year old female who presents to the ED for evaluation of abdominal pain. The patient reports that she developed some nausea and diarrhea, with intermittent fever 10 days ago. She did not initially think much of this. Over the past few days, she has additionally developed a \"stretching\" upper abdominal pain with continued intermittent fevers reaching 100.3F. Her pain has progressively worsened, so she ultimately presented to the ED for evaluation. Here in the ED, she states she has had no vomiting. Her nausea is worsened with PO intake, though she does not describe any acute worsening of her abdominal pain with this. Her pain is somewhat alleviated with lying down. She denies any family or personal history of cholecystitis. She has not had any ibuprofen or Tylenol yet today for her pain. Of note, she has additionally been sleeping more/feeling less active over the last few days.    Allergies:  NKDA    Medications:    Elidel Cream    Past Medical History:    Atopic dermatitis    Past Surgical History:    Abdominoplasty   section  Diagnostic laparoscopy  Tubal ligation    Family History:    Diabetes  Hyperlipidemia  HTN    Social History:  Marital Status:   [2]  Negative for tobacco use.  Alcohol use: yes  Negative for drug use.    Review of Systems   Constitutional: Positive for appetite change and fever.   Gastrointestinal: Positive for abdominal pain (upper), diarrhea and nausea. Negative for blood in stool, constipation and vomiting.   All other systems reviewed and are negative.      Physical Exam     Patient Vitals for the past 24 hrs:   BP Temp Temp src Pulse Resp SpO2 Height Weight   19 1353 -- -- -- -- -- 99 % -- --   19 1352 127/90 -- -- 76 -- 98 % -- --   19 1055 138/88 98.3  F (36.8  C) Oral 84 16 99 % 1.626 m (5' 4\") 68 kg (150 lb)     Physical Exam   Eye:  Pupils are equal, " round, and reactive.  Extraocular movements intact.    ENT:  No rhinorrhea.  Moist mucus membranes.  Normal tongue and tonsil.    Cardiac:  Regular rate and rhythm.  No murmurs, gallops, or rubs.    Pulmonary:  Clear to auscultation bilaterally.  No wheezes, rales, or rhonchi.    Abdomen:  There is significant focal tenderness in the RUQ without rebound or guarding.     Musculoskeletal:  Normal movement of all extremities without evidence for deficit.    Skin:  Warm and dry without rashes.    Neurologic:  Non-focal exam without asymmetric weakness or numbness.     Psychiatric:  Normal affect with appropriate interaction with examiner.    Emergency Department Course     Imaging:  Radiographic findings were communicated with the patient who voiced understanding of the findings.  US Abdomen, RUQ:  Multiple gallstones with gallbladder wall thickening suspicious for cholecystitis. No biliary dilatation, as per radiology.     Laboratory:  CBC: WBC: 8.0, HGB: 13.4, PLT: 249  CMP: ALT 86 (H), AST 49 (H), o/w WNL (Creatinine: 0.90)  Lipase: 82    UA with Microscopic: protein albumin 30 (A), bacteria few (A), SE 4 (H), mucous present (A), o/w WNL  HCG: negative    Interventions:  1129 NS 1L IV   Toradol 15 mg IV  1317 Zosyn 3.375 g IV  1341 NS 1L IV  Please see MAR for full list of medications administered in the ED.    Emergency Department Course:  Nursing notes and vitals reviewed. (1117) I performed an exam of the patient as documented above.     IV inserted. Medicine administered as documented above. Blood drawn. This was sent to the lab for further testing, results above.    The patient provided a urine sample here in the emergency department. This was sent for laboratory testing, findings above.     The patient was sent for an Abdomen US while in the emergency department, findings above.     (1255) I rechecked the patient and discussed the results of her workup thus far.    (1300) I consulted with Dr. Fournier,  General Surgery, regarding the patient's history and presentation here in the emergency department. They have agreed to accept the patient for surgery    Findings and plan explained to the Patient and spouse who consents to admission.     Impression & Plan      Medical Decision Making:  Shireen Bautista is a 48 year old female presenting with approximately 1 week of intermittent abdominal pain and nausea, now with more significant right upper quadrant pain and fever for the last day.  I found her to be focally tender in this right upper quadrant, concerning for biliary disease.  Ultrasound of the gallbladder shows multiple stones along with thickening of the gallbladder wall concerning for cholecystitis.  She had a fever this morning, though none at this time and she feels improved after receiving Toradol.  I gave her a dose of Zosyn.  I spoke with Dr. Fournier of general surgery who agrees to take the patient to the OR later today for cholecystectomy.  Patient's questions were answered and she is comfortable with the plan for admission.    Diagnosis:    ICD-10-CM    1. Acute cholecystitis K81.0 Lipase     Disposition:  Admitted to Dr. Fournier    Scribe Disclosure:  I, Shireen Guerra, am serving as a scribe on 5/8/2019 at 11:18 AM to personally document services performed by Trierweiler, Chad A, MD based on my observations and the provider's statements to me.     Shireen Guerra  5/8/2019    EMERGENCY DEPARTMENT       Trierweiler, Chad A, MD  05/09/19 0718

## 2019-05-08 NOTE — CONSULTS
"[facility] General Surgery Consultation    Shireen Bautista MRN# [id number]   YOB: 1970 Age: 48 year old      Date of Admission:  5/8/2019  Date of Consult: 5/8/2019         Assessment and Plan:   Patient is a 48 year old female with acute cholecystitis. We discussed options and she would like to proceed with laparoscopic cholecystectomy after a discussion of the risks, benefits, indications and alternatives.     PLAN:  Laparoscopic cholecystectomy, possible cholangiograms         Requesting Physician:      Dr. Treilweiler        Chief Complaint:     Chief Complaint   Patient presents with     Abdominal Pain          History of Present Illness:   Shireen Bautista is a 48 year old female who was seen in consultation at the request of Dr. Treilweiler who presented with abdominal pain associated with nausea. She reports she was in [facility] last week and eating fattier meals and wine and had some abdominal discomfort and nausea. She did have some low grade fevers at that time. Over the past weekend, pain was improved until yesterday when pain worsened and she again had low grade fevers to 100.7. She presented to the ER today due to the pain and associated nausea. She had imaging which revealed a thickened gallbladder wall and cholelithiasis consistent with cholecystitis. WBC is normal.  AST/ALT are very mildly elevated. Bilirubin and alk phos are normal.            Physical Exam:   Blood pressure 138/88, pulse 84, temperature 98.3  F (36.8  C), temperature source Oral, resp. rate 16, height 1.626 m (5' 4\"), weight 68 kg (150 lb), last menstrual period 04/22/2019, SpO2 99 %.  150 lbs 0 oz  General: no distress, sitting comfortably in bed  Psych: Alert and Oriented.  Normal affect  Neurological: grossly intact  Eyes: Sclera clear  Respiratory:  nonlabored breathing  Cardiovascular:  Regular Rate and Rhythm   GI: abdomen is soft, multiple well healed surgical scars, tender to palpation " in RUQ.    Lymphatic/Hematologic/Immune:  No femoral or cervical lymphadenopathy.  Integumentary:  No rashes         Past Medical History:   History reviewed. No pertinent past medical history.         Past Surgical History:     Past Surgical History:   Procedure Laterality Date     ABDOMINOPLASTY  2012      SECTION  2005, 2008,      LAPAROSCOPY DIAGNOSTIC (GENERAL)  2004, ?2007    endmetriosis     TUBAL LIGATION  2010    with c/s            Current Medications:           piperacillin-tazobactam  3.375 g Intravenous Once       ondansetron         Home Medications:     Prior to Admission medications    Medication Sig Last Dose Taking? Auth Provider   ELIDEL 1 % external cream APPLY TO AFFECTED AREA TWICE DAILY X 2 WEEKS  Yes Reported, Patient            Allergies:   No Known Allergies         Family History:     Family History   Problem Relation Age of Onset     Diabetes Father      Lipids Father      Cardiovascular Maternal Grandmother         CHF     Unknown/Adopted Maternal Grandfather      Unknown/Adopted Paternal Grandfather      Lipids Mother      Hypertension Mother      Hypertension Brother      Celiac Disease Daughter      Asthma No family hx of      C.A.D. No family hx of      Thyroid Disease No family hx of            Social History:   Shireen Bautista  reports that she has never smoked. She has never used smokeless tobacco. She reports that she drinks alcohol. She reports that she does not use drugs.          Review of Systems:   The 10 point Review of Systems is negative other than noted in the HPI.         Labs/Imaging   All new lab and imaging data was reviewed.   I have personally reviewed the imaging studies including the abdominal US.         Kiki Fournier MD

## 2019-05-08 NOTE — ED NOTES
"[facility]  ED Nurse Handoff Report    ED Chief complaint: Abdominal Pain      ED Diagnosis:   Final diagnoses:   Acute cholecystitis       Code Status: Full Code    Allergies: No Known Allergies    Activity level - Baseline/Home:  Independent    Activity Level - Current:   Independent     Needed?: No    Isolation: No  Infection: Not Applicable  Bariatric?: No    Vital Signs:   Vitals:    05/08/19 1055   BP: 138/88   Pulse: 84   Resp: 16   Temp: 98.3  F (36.8  C)   TempSrc: Oral   SpO2: 99%   Weight: 68 kg (150 lb)   Height: 1.626 m (5' 4\")       Cardiac Rhythm: ,        Pain level: 0-10 Pain Scale: 2    Is this patient confused?: No   Does this patient have a guardian?  No         If yes, is there guardianship documents in the Epic \"Code/ACP\" activity?  N/A         Guardian Notified?  N/A  [facility] - Suicide Severity Rating Scale Completed?  Yes  If yes, what color did the patient score?  White    Patient Report: Initial Complaint: Pt presents with complaints of abd pain for 10 days. Pain has worsened in past two days. In addition to abd pain, some nausea, fever, and diarrhea have been intermittent over same time period.   Focused Assessment: +Right upper quad pain with associated N/D/F  Tests Performed: Labs and Abd US  Abnormal Results: Results for DALLAS THOMPSON (MRN [id number]) as of 5/8/2019 13:50   Ref. Range 5/8/2019 11:01 5/8/2019 11:25   Sodium Latest Ref Range: 133 - 144 mmol/L  139   Potassium Latest Ref Range: 3.4 - 5.3 mmol/L  3.5   Chloride Latest Ref Range: 94 - 109 mmol/L  107   Carbon Dioxide Latest Ref Range: 20 - 32 mmol/L  23   Urea Nitrogen Latest Ref Range: 7 - 30 mg/dL  11   Creatinine Latest Ref Range: 0.52 - 1.04 mg/dL  0.90   GFR Estimate Latest Ref Range: >60 mL/min/1.73_m2  76   GFR Estimate If Black Latest Ref Range: >60 mL/min/1.73_m2  88   Calcium Latest Ref Range: 8.5 - 10.1 mg/dL  9.2   Anion Gap Latest Ref Range: 3 - 14 mmol/L  9   Albumin Latest Ref " Range: 3.4 - 5.0 g/dL  4.0   Protein Total Latest Ref Range: 6.8 - 8.8 g/dL  7.5   Bilirubin Total Latest Ref Range: 0.2 - 1.3 mg/dL  0.8   Alkaline Phosphatase Latest Ref Range: 40 - 150 U/L  81   ALT Latest Ref Range: 0 - 50 U/L  86 (H)   AST Latest Ref Range: 0 - 45 U/L  49 (H)   HCG Qual Urine Latest Ref Range: NEG^Negative  Negative    Glucose Latest Ref Range: 70 - 99 mg/dL  87   WBC Latest Ref Range: 4.0 - 11.0 10e9/L  8.0   Hemoglobin Latest Ref Range: 11.7 - 15.7 g/dL  13.4   Hematocrit Latest Ref Range: 35.0 - 47.0 %  40.1   Platelet Count Latest Ref Range: 150 - 450 10e9/L  249   RBC Count Latest Ref Range: 3.8 - 5.2 10e12/L  4.41   MCV Latest Ref Range: 78 - 100 fl  91   MCH Latest Ref Range: 26.5 - 33.0 pg  30.4   MCHC Latest Ref Range: 31.5 - 36.5 g/dL  33.4   RDW Latest Ref Range: 10.0 - 15.0 %  13.2   Diff Method Unknown  Automated Method   % Neutrophils Latest Units: %  78.4   % Lymphocytes Latest Units: %  13.6   % Monocytes Latest Units: %  6.0   % Eosinophils Latest Units: %  1.5   % Basophils Latest Units: %  0.1   % Immature Granulocytes Latest Units: %  0.4   Nucleated RBCs Latest Ref Range: 0 /100  0   Absolute Neutrophil Latest Ref Range: 1.6 - 8.3 10e9/L  6.3   Absolute Lymphocytes Latest Ref Range: 0.8 - 5.3 10e9/L  1.1   Absolute Monocytes Latest Ref Range: 0.0 - 1.3 10e9/L  0.5   Absolute Eosinophils Latest Ref Range: 0.0 - 0.7 10e9/L  0.1   Absolute Basophils Latest Ref Range: 0.0 - 0.2 10e9/L  0.0   Abs Immature Granulocytes Latest Ref Range: 0 - 0.4 10e9/L  0.0   Absolute Nucleated RBC Unknown  0.0   Color Urine Unknown Yellow    Appearance Urine Unknown Slightly Cloudy    Glucose Urine Latest Ref Range: NEG^Negative mg/dL Negative    Bilirubin Urine Latest Ref Range: NEG^Negative  Negative    Ketones Urine Latest Ref Range: NEG^Negative mg/dL Negative    Specific Gravity Urine Latest Ref Range: 1.003 - 1.035  1.025    pH Urine Latest Ref Range: 5.0 - 7.0 pH 5.5    Protein Albumin  Urine Latest Ref Range: NEG^Negative mg/dL 30 (A)    Urobilinogen mg/dL Latest Ref Range: 0.0 - 2.0 mg/dL Normal    Nitrite Urine Latest Ref Range: NEG^Negative  Negative    Blood Urine Latest Ref Range: NEG^Negative  Negative    Leukocyte Esterase Urine Latest Ref Range: NEG^Negative  Negative    Source Unknown Midstream Urine    WBC Urine Latest Ref Range: 0 - 5 /HPF 3    RBC Urine Latest Ref Range: 0 - 2 /HPF 1    Bacteria Urine Latest Ref Range: NEG^Negative /HPF Few (A)    Squamous Epithelial /HPF Urine Latest Ref Range: 0 - 1 /HPF 4 (H)    Mucous Urine Latest Ref Range: NEG^Negative /LPF Present (A)      Treatments provided: 15 MG Ketorolac  1L NS Bolus, 3.75G Zosyn    Family Comments:  at bedside    OBS brochure/video discussed/provided to patient/family: N/A              Name of person given brochure if not patient: NA              Relationship to patient: NA    ED Medications:   Medications   0.9% sodium chloride BOLUS (1,000 mLs Intravenous New Bag 5/8/19 1129)     Followed by   sodium chloride 0.9% infusion (has no administration in time range)   ondansetron (ZOFRAN) injection 4 mg (has no administration in time range)   piperacillin-tazobactam (ZOSYN) 3.375 g vial to attach to  mL bag (3.375 g Intravenous New Bag 5/8/19 1317)   acetaminophen (TYLENOL) tablet 650 mg (has no administration in time range)   acetaminophen (TYLENOL) Suppository 650 mg (has no administration in time range)   naloxone (NARCAN) injection 0.1-0.4 mg (has no administration in time range)   melatonin tablet 1 mg (has no administration in time range)   ondansetron (ZOFRAN-ODT) ODT tab 4 mg (has no administration in time range)     Or   ondansetron (ZOFRAN) injection 4 mg (has no administration in time range)   sodium chloride 0.9% infusion ( Intravenous New Bag 5/8/19 1341)   HYDROmorphone (PF) (DILAUDID) injection 0.5 mg (has no administration in time range)   prochlorperazine (COMPAZINE) injection 10 mg (has no  administration in time range)     Or   prochlorperazine (COMPAZINE) tablet 10 mg (has no administration in time range)     Or   prochlorperazine (COMPAZINE) Suppository 25 mg (has no administration in time range)   ketorolac (TORADOL) injection 15 mg (15 mg Intravenous Given 5/8/19 1129)       Drips infusing?:  No    For the majority of the shift this patient was Green.   Interventions performed were NA.    Severe Sepsis OR Septic Shock Diagnosis Present: No    To be done/followed up on inpatient unit:  None    ED NURSE PHONE NUMBER: [id number]

## 2019-05-09 VITALS
HEIGHT: 64 IN | HEART RATE: 70 BPM | BODY MASS INDEX: 26.12 KG/M2 | RESPIRATION RATE: 16 BRPM | SYSTOLIC BLOOD PRESSURE: 119 MMHG | DIASTOLIC BLOOD PRESSURE: 76 MMHG | TEMPERATURE: 96 F | OXYGEN SATURATION: 96 % | WEIGHT: 153 LBS

## 2019-05-09 PROCEDURE — 25000132 ZZH RX MED GY IP 250 OP 250 PS 637: Performed by: SURGERY

## 2019-05-09 RX ADMIN — ACETAMINOPHEN 650 MG: 325 TABLET, FILM COATED ORAL at 05:24

## 2019-05-09 RX ADMIN — ACETAMINOPHEN 650 MG: 325 TABLET, FILM COATED ORAL at 09:25

## 2019-05-09 NOTE — DISCHARGE INSTRUCTIONS
Same Day Surgery Discharge Instructions for  Sedation and General Anesthesia       It's not unusual to feel dizzy, light-headed or faint for up to 24 hours after surgery or while taking pain medication.  If you have these symptoms: sit for a few minutes before standing and have someone assist you when you get up to walk or use the bathroom.      You should rest and relax for the next 24 hours. We recommend you make arrangements to have an adult stay with you for at least 24 hours after your discharge.  Avoid hazardous and strenuous activity.      DO NOT DRIVE any vehicle or operate mechanical equipment for 24 hours following the end of your surgery.  Even though you may feel normal, your reactions may be affected by the medication you have received.      Do not drink alcoholic beverages for 24 hours following surgery.       Slowly progress to your regular diet as you feel able. It's not unusual to feel nauseated and/or vomit after receiving anesthesia.  If you develop these symptoms, drink clear liquids (apple juice, ginger ale, broth, 7-up, etc. ) until you feel better.  If your nausea and vomiting persists for 24 hours, please notify your surgeon.        All narcotic pain medications, along with inactivity and anesthesia, can cause constipation. Drinking plenty of liquids and increasing fiber intake will help.      For any questions of a medical nature, call your surgeon.      Do not make important decisions for 24 hours.      If you had general anesthesia, you may have a sore throat for a couple of days related to the breathing tube used during surgery.  You may use Cepacol lozenges to help with this discomfort.  If it worsens or if you develop a fever, contact your surgeon.       If you feel your pain is not well managed with the pain medications prescribed by your surgeon, please contact your surgeon's office to let them know so they can address your concerns.         [facility] - SUR[facility]  [facility]SULT[facility]  Discharge Instructions: Post-Operative Laparoscopic Cholecystectomy    ACTIVITY    Expect to feel tired after your surgery.  This will gradually resolve.    Take frequent, short walks and increase your activity gradually.      Avoid strenuous physical activity or heavy lifting greater than 15-20 lbs. for 2-3 weeks.  You may climb stairs.    You may drive without restrictions when you are not using any prescription pain medication and comfortable in a car.    You may return to work/school when you are comfortable without any prescription pain medication.    WOUND CARE    You may remove your outer dressing or Band-Aids and shower 48 hours after the surgery.  Pat your incisions dry and leave open to air.  Re-apply dressing (Band-aid or gauze/tape) as needed for comfort or drainage.    You may have steri-strips (looks like white tape) on your incision.  You may peel off the steri-strip 2 weeks after surgery if they have not peeled off on their own.    Do not soak your incisions in a tub or pool for 2 weeks.     Do not apply any lotions, creams, or ointments to your incisions.    A ridge under incisions is normal and will gradually resolve.    DIET    Start with liquids, then gradually resume your regular diet as tolerated.  Avoid heavy, spicy, and greasy meals for 2-3 days.    Drink plenty of liquids to stay hydrated.     It is not uncommon to experience some loose stools or diarrhea after surgery.  This is your body s way of adapting to the bile which will slowly drain into your intestine. A low fat diet may help with this.  This should improve over 1-2 months.     PAIN    Expect some tenderness and discomfort at the incision sites.  Use the prescribed pain medication at your discretion.  Expect gradual resolution of your pain over several days.    You may take ibuprofen with food (unless you have been told not to) instead of or in addition to your prescribed pain medication.  If you are taking Norco or  Percocet, do not take any additional acetaminophen/APAP/Tylenol.    Do not drink alcohol or drive while you are taking pain medications.    You may apply ice to your incisions in 20 minute intervals as needed for the next 48 hours.  After that time, consider switching to heat if you prefer.    EXPECTATIONS    Pain medications can cause constipation.  Limit use when possible.  Take over the counter stool softener/stimulant, such as Colace or Senna, 1-2 times a day with plenty of water.  You may take a mild over the counter laxative, such as Miralax or a suppository, as needed.  You may discontinue these medications once you are having regular bowel movements and/or are no longer taking your narcotic pain medication.      You may have shoulder or upper back discomfort due to the gas used in surgery.  This is temporary and should resolve in 48-72 hours.  Short, frequent walks may help with this.      FOLLOW UP    Our office will contact you approximately 2 weeks to check on your progress and answer any questions you may have.  If you are doing well, you will not need to return for a follow up appointment.  If any concerns are identified over the phone, we will help you make an appointment to see a provider.    CALL OUR OFFICE IF YOU HAVE:     Chills or fever above 101.5 F.    Increased redness or drainage at your incisions.    Significant bleeding.    Pain not relieved by your pain medication or rest.    Increasing pain after the first 48 hours.    Any other concerns or questions.      Revised January 2018      **If you have concerns or questions about your procedure,    please contact Dr Sears at  [phone]**

## 2019-05-09 NOTE — PROGRESS NOTES
"Surgery Note  05/09/19     S: pain is well controlled, no nausea, vomiting. Is voiding.     O:  /74   Pulse 72   Temp 97.4  F (36.3  C) (Oral)   Resp 16   Ht 1.626 m (5' 4\")   Wt 69.4 kg (153 lb)   LMP 04/22/2019   SpO2 97%   BMI 26.26 kg/m     Gen: no distress  Abd: soft, appropriately tender, minimal distension.     A/P  POD#1 s/p lap gregory.   -- DC if tolerates breakfast and pain is controlled.       Reece Blackman MD  General Surgery Resident  Pager [phone]    "

## 2019-05-09 NOTE — PLAN OF CARE
A&O x4, VSS on RA, continuous pulse oxymetry in place. IV fluids at 100ml/hr. BS hypoactive. 4 lap sites cdi. Pt tolerating clear. Pain with movement. Due to void and dangle. Possible discharge tomorrow.

## 2019-05-09 NOTE — PLAN OF CARE
Pt A&O x4. VSS on RA. Pt taking Tylenol for pain. Voiding adequately. Tolerating regular diet. Ambulating independently. Lap sites CDI. Pt given okay to discharge. Went over instructions with pt, pt verbalized understanding. Pt given discharge meds. Pt discharged to home with spouse.

## 2019-05-09 NOTE — ANESTHESIA CARE TRANSFER NOTE
Patient: Shireen Bautista    Procedure(s):  CHOLECYSTECTOMY, LAPAROSCOPIC    Diagnosis: UNKNOWN  Diagnosis Additional Information: No value filed.    Anesthesia Type:   General, RSI, ETT     Note:  Airway :Face Mask  Patient transferred to:PACU  Handoff Report: Identifed the Patient, Identified the Reponsible Provider, Reviewed the pertinent medical history, Discussed the surgical course, Reviewed Intra-OP anesthesia mangement and issues during anesthesia, Set expectations for post-procedure period and Allowed opportunity for questions and acknowledgement of understanding      Vitals: (Last set prior to Anesthesia Care Transfer)    CRNA VITALS  5/8/2019 1907 - 5/8/2019 1943      5/8/2019             Pulse:  96    SpO2:  96 %    Resp Rate (observed):  2  (Abnormal)                 Electronically Signed By: NIGEL Nelson CRNA  May 8, 2019  7:43 PM

## 2019-05-09 NOTE — OP NOTE
General Surgery Operative Note    PREOPERATIVE DIAGNOSIS: Acute cholecystitis    POSTOPERATIVE DIAGNOSIS: Same    PROCEDURE:   Procedure(s):  CHOLECYSTECTOMY, LAPAROSCOPIC    ANESTHESIA:  General.    PREOPERATIVE MEDICATIONS:  Ancef IV.    SURGEON:  Monty Sears MD    ASSISTANT:  Reece Blackman MD  A first assistant was necessary owing to challenging laparoscopic visualization and exposure.  Retraction was also necessary.    INDICATIONS: Patient with a 1+ week history of vague abdominal pain and low-grade fevers.  Ultrasound showed gallstones and a thickened gallbladder wall.    PROCEDURE:  The patient was taken to the operating suite and uneventfully endotracheally intubated.  The abdomen was prepped and draped in a sterile fashion.  Surgeon initiated timeout was acknowledged.  We entered the abdomen in the left upper quadrant using Visiport technique.  Three other trocars were placed under laparoscopic visualization.  We elevated the liver and were able to identify a somewhat inflamed gallbladder.  The gallbladder was grasped and used to elevate the liver further.  We began dissecting out some fatty adhesions down near the neck of the gallbladder until a cystic duct was encountered.  We continued our dissection using combination of sharp and blunt dissection until the cystic duct was largely dissected out.  We continued our dissection up along the sides of the gallbladder, both medially and laterally, until we had created a space between the gallbladder and the liver.  At this point, we encountered the cystic artery, just posterior and lateral to the cystic duct.  This again was dissected out.  Once we had created a window where only the cystic artery and duct were noted to be entering the gallbladder, we felt that this represented our critical view.  The cystic artery and duct were then doubly clipped and divided.  We continued our dissection up along the body of the gallbladder, freeing all attachments and  adhesions of the gallbladder to the liver.  Gallbladder was removed from the liver in an atraumatic fashion.  The gallbladder was then brought up through the umbilical port site and removed from the abdomen.  The gallbladder fossa was reinspected, and all areas of bleeding were managed with electrocautery.  We irrigated the area with normal saline and aspirated it out.  We then removed the umbilical port trocar and closed the fascia with a figure-of-eight 0 Vicryl suture.  This was done using the Alphonso-Clay device.  We then reinspected the abdomen, and everything appeared to be in pristine condition.  We removed the trocars under laparoscopic visualization and desufflated the abdomen with the [place] suction .  The skin edges were reapproximated with 4-0 Vicryl and Steri-Strips.  The patient was uneventfully extubated, awakened and taken to the PACU in stable condition.  At the conclusion of the case, all lap and needle counts were correct.      ESTIMATED BLOOD LOSS: 10 mL    INTRAOPERATIVE FINDINGS: Inflamed edematous gallbladder.    Monty Sears MD, MD

## 2019-05-09 NOTE — BRIEF OP NOTE
[facility]    Brief Operative Note    Pre-operative diagnosis: Cholecystitis   Post-operative diagnosis Cholecystitis with hydrops  Procedure: Procedure(s):  CHOLECYSTECTOMY, LAPAROSCOPIC  Surgeon: Surgeon(s) and Role:     * Monty Sears MD - Primary     * Reece Blackman MD - Resident - Assisting  Anesthesia: General   Estimated blood loss: Less than 10 ml  Drains: None  Specimens:   ID Type Source Tests Collected by Time Destination   A : GALLBLADDER AND CONTENTS Tissue Gallbladder and Contents SURGICAL PATHOLOGY EXAM Monty Sears MD 5/8/2019  7:13 PM      Findings:   hydroptic gallbladder with evidence of cholecystitis. .  Complications: None.  Implants:  * No implants in log *

## 2019-05-09 NOTE — DISCHARGE SUMMARY
[facility] Discharge Summary    Shireen Bautista MRN# [id number]   Age: 48 year old YOB: 1970     Date of Admission:  5/8/2019  Date of Discharge:  05/09/19   Admitting Provider:  Monty Sears MD  Discharge Provider:  Monty Sears MD  Discharging Service: General Surgery     Primary Provider: Dominique Robbins  Primary Care Physician Phone Number: [phone]          Admission Diagnoses:   Principle Diagnosis: Acute cholecystitis [K81.0]  Secondary Diagnoses: Acute post operative pain          Discharge Diagnosis:   Acute cholecystitis           Procedures:   Procedure(s): Laparoscopic cholecystectomy             Discharge Medications:     Current Discharge Medication List      START taking these medications    Details   HYDROcodone-acetaminophen (NORCO) 5-325 MG tablet Take 1-2 tablets by mouth every 4 hours as needed (Moderate to Severe Pain)  Qty: 20 tablet, Refills: 0    Associated Diagnoses: Acute post-operative pain         CONTINUE these medications which have NOT CHANGED    Details   cetirizine (ZYRTEC) 10 MG tablet Take 10 mg by mouth daily as needed for allergies      ketotifen (ZADITOR/REFRESH ANTI-ITCH) 0.025 % ophthalmic solution Place 1 drop into both eyes 2 times daily as needed for itching      pimecrolimus (ELIDEL) 1 % external cream Apply topically 2 times daily as needed (eczema)                 Allergies:       No Known Allergies          Brief History of Illness:   Shireen Bautista is a 48 year old female who developed abdominal pain with nausea over the last couple of weeks, exacerbated by rich and fatty meals. She even had low grade fever and some associated diarrhea, all in all, culminating in her seeking care.     She was found to have cholecystitis on abd US.     After discussing the risks, benefits, and possible complications, informed consent was obtained and the patient underwent the above procedure.  There were no complications.  Please see the  "Operative Report for full details.           Hospital Course:   Shireen Bautista's hospital course was unremarkable.  She recovered as anticipated and experienced no post-operative complications. Given the time of her case, she elected to stay overnight for monitoring and symptom control.     On the date of discharge, the patient was discharged to home in stable condition and afebrile.  She verbalized understanding of all discharge instructions and felt comfortable with the discharge plan.  She was asked to call with any further questions or concerns.         Condition on Discharge:      Discharge condition: Stable   Discharge vitals: Blood pressure 119/76, pulse 70, temperature 96  F (35.6  C), temperature source Oral, resp. rate 16, height 1.626 m (5' 4\"), weight 69.4 kg (153 lb), last menstrual period 04/22/2019, SpO2 96 %.   Gen: no distress  CV: rate as above  Pulm: normal resp effort.   Abd: soft, appropriately tender, mildly distended.            Discharge Disposition:   Discharged to home          Discharge Instructions and Follow-Up:      We will call Shireen in ~2 weeks. She was provided with our office number if there are issues before then.   -- no lifting more than 15-20 lbs for 2-3 weeks.   - bandaids off tomorrow, ok to shower tomorrow.       Reece Blackman MD  General Surgery Resident  Pager [phone]         "

## 2019-05-09 NOTE — ANESTHESIA POSTPROCEDURE EVALUATION
Patient: Shireen Bautista    Procedure(s):  CHOLECYSTECTOMY, LAPAROSCOPIC    Diagnosis:UNKNOWN  Diagnosis Additional Information: No value filed.    Anesthesia Type:  General, RSI, ETT    Note:  Anesthesia Post Evaluation    Patient location during evaluation: PACU  Patient participation: Able to fully participate in evaluation  Level of consciousness: awake  Pain management: adequate  Airway patency: patent  Cardiovascular status: acceptable  Respiratory status: acceptable  Hydration status: acceptable  PONV: controlled     Anesthetic complications: None          Last vitals:  Vitals:    05/08/19 2007 05/08/19 2010 05/08/19 2020   BP:  118/82 110/73   Pulse:  75 74   Resp: 10 12 10   Temp:  36.8  C (98.2  F) 36.8  C (98.2  F)   SpO2: 99% 95% 92%         Electronically Signed By: Jesse Lezama MD  May 8, 2019  8:24 PM

## 2019-05-09 NOTE — PLAN OF CARE
Plan of Care  PT A&O, VSS, pt given 1 Norco for pain early in shift, this morning pt only wished to have tylenol.  Pt voiding adequately, pt up to bathroom without difficulty.  4 lap sites CD/I.  Will continue to monitor.

## 2019-05-10 LAB — COPATH REPORT: NORMAL

## 2019-06-04 ENCOUNTER — TELEPHONE (OUTPATIENT)
Dept: SURGERY | Facility: CLINIC | Age: 49
End: 2019-06-04

## 2019-06-04 NOTE — TELEPHONE ENCOUNTER
[facility]  Post op call note - Laparoscopic Cholecystectomy    Shireen Bautista was called for an update regarding her recovery.  She underwent a laparoscopic cholecystectomy by Dr. Sears on 5/8.  Today she tells me she is doing well and denies any complaints.  She currently does not need any pain medications.  She is eating a normal diet and her bowels are regular. She does report that pre operatively she was having loose stools, and once the narcotics were out of her systems, she has returned to her base line.   The patient states she is slowly resuming normal activity.  She states her wounds are healing well and the steri strips are off.  She denies any erythema or drainage at her wounds.  The patient denies fever/chills, n/v/d, abdominal pain, changes in urination or BM, or wound concerns. She reports that her small midline incision from her larger port (at the site of prior abdominoplasty) remains sensitive as it was pre operatively.     The pathology revealed acute and chronic cholecystitis.  This was discussed with the patient.  She was advised to advance her activity as tolerated without restrictions.  The patient states all of her questions were answered and she understands our discussion.  She agrees to follow up as needed and to call our office with any concerns including if she has concerns about wound sensitivity or ongoing loose stools.     Reece Blackman MD  General Surgery Resident  Pager [phone]

## 2019-08-13 ENCOUNTER — ANCILLARY PROCEDURE (OUTPATIENT)
Dept: MAMMOGRAPHY | Facility: CLINIC | Age: 49
End: 2019-08-13
Attending: FAMILY MEDICINE
Payer: COMMERCIAL

## 2019-08-13 DIAGNOSIS — Z12.31 VISIT FOR SCREENING MAMMOGRAM: ICD-10-CM

## 2019-08-13 PROCEDURE — 77063 BREAST TOMOSYNTHESIS BI: CPT | Performed by: STUDENT IN AN ORGANIZED HEALTH CARE EDUCATION/TRAINING PROGRAM

## 2019-08-13 PROCEDURE — 77067 SCR MAMMO BI INCL CAD: CPT | Performed by: STUDENT IN AN ORGANIZED HEALTH CARE EDUCATION/TRAINING PROGRAM

## 2019-08-14 ENCOUNTER — OFFICE VISIT (OUTPATIENT)
Dept: GASTROENTEROLOGY | Facility: CLINIC | Age: 49
End: 2019-08-14
Payer: COMMERCIAL

## 2019-08-14 VITALS
HEART RATE: 71 BPM | WEIGHT: 153.7 LBS | SYSTOLIC BLOOD PRESSURE: 123 MMHG | DIASTOLIC BLOOD PRESSURE: 81 MMHG | BODY MASS INDEX: 26.24 KG/M2 | HEIGHT: 64 IN | OXYGEN SATURATION: 98 %

## 2019-08-14 DIAGNOSIS — R19.7 DIARRHEA, UNSPECIFIED TYPE: Primary | ICD-10-CM

## 2019-08-14 LAB
ALBUMIN SERPL-MCNC: 3.7 G/DL (ref 3.4–5)
ALP SERPL-CCNC: 53 U/L (ref 40–150)
ALT SERPL W P-5'-P-CCNC: 26 U/L (ref 0–50)
AST SERPL W P-5'-P-CCNC: 17 U/L (ref 0–45)
BILIRUB DIRECT SERPL-MCNC: <0.1 MG/DL (ref 0–0.2)
BILIRUB SERPL-MCNC: 0.4 MG/DL (ref 0.2–1.3)
PROT SERPL-MCNC: 6.7 G/DL (ref 6.8–8.8)
TSH SERPL DL<=0.005 MIU/L-ACNC: 0.96 MU/L (ref 0.4–4)

## 2019-08-14 PROCEDURE — 83516 IMMUNOASSAY NONANTIBODY: CPT | Mod: 59 | Performed by: INTERNAL MEDICINE

## 2019-08-14 PROCEDURE — 84443 ASSAY THYROID STIM HORMONE: CPT | Performed by: INTERNAL MEDICINE

## 2019-08-14 PROCEDURE — 83516 IMMUNOASSAY NONANTIBODY: CPT | Performed by: INTERNAL MEDICINE

## 2019-08-14 PROCEDURE — 80076 HEPATIC FUNCTION PANEL: CPT | Performed by: INTERNAL MEDICINE

## 2019-08-14 PROCEDURE — 99204 OFFICE O/P NEW MOD 45 MIN: CPT | Performed by: INTERNAL MEDICINE

## 2019-08-14 PROCEDURE — 36415 COLL VENOUS BLD VENIPUNCTURE: CPT | Performed by: INTERNAL MEDICINE

## 2019-08-14 ASSESSMENT — MIFFLIN-ST. JEOR: SCORE: 1317.18

## 2019-08-14 ASSESSMENT — PAIN SCALES - GENERAL: PAINLEVEL: NO PAIN (0)

## 2019-08-14 NOTE — PATIENT INSTRUCTIONS
Start taking fiber supplements (benefiber or metamucil) to help bulk up your stools.  You can also use imodium as needed. Please get labs done today.  Schedule a colonoscopy.

## 2019-08-14 NOTE — NURSING NOTE
"Shireen Bautista's goals for this visit include:   Chief Complaint   Patient presents with     Consult     Loose stool       She requests these members of her care team be copied on today's visit information: yes    PCP: Dominique Robbins    Referring Provider:  Jaqui Barlow MD  [address] [place] [address]    /81   Pulse 71   Ht 1.626 m (5' 4\")   Wt 69.7 kg (153 lb 11.2 oz)   SpO2 98%   BMI 26.38 kg/m      Do you need any medication refills at today's visit? No    Marta Villagran CMA      "

## 2019-08-14 NOTE — PROGRESS NOTES
GI CLINIC VISIT    CC/REFERRING MD:  Jaqui Mckeon*  REASON FOR CONSULTATION: Loose stools  Jaqui Ev Mckeon* for   Chief Complaint   Patient presents with     Consult     Loose stool         HPI    Patient here today to discuss loose stools which have been a chronic issue.  Has been going on for years.  Thought initially that her symptoms may have been d/t her gallbladder - but symptoms haven't seemed to change much since undergoing a cholecystectomy.  Does have some urgency - usually after eating a meal.  Generally has a BM 2-4 times per day, although symptoms can vary.  Does notice that diarrhea is worse around the time of her menses.  No blood in the stool.  No weight loss.  Has never tried any medications for this before.  Has never seen GI before.  Rare heartburn.  No nausea roberson vomiting.  No dysphagia.  Is concerned about celiac as her daughter has it.      ROS:    No fevers or chills  No weight loss  No blurry vision, double vision or change in vision  No sore throat  No lymphadenopathy  No headache, paraesthesias, or weakness in a limb  No shortness of breath or wheezing  No chest pain or pressure  No arthralgias or myalgias  No rashes or skin changes  No odynophagia or dysphagia  No BRBPR, hematochezia, melena  No dysuria, frequency or urgency  No hot/cold intolerance or polyria  No anxiety or depression    PROBLEM LIST  Patient Active Problem List    Diagnosis Date Noted     Cholecystitis 2019     Priority: Medium     AD (atopic dermatitis) 10/30/2015     Priority: Medium       PERTINENT PAST MEDICAL HISTORY:  No past medical history on file.    PREVIOUS SURGERIES:  Past Surgical History:   Procedure Laterality Date     ABDOMINOPLASTY  2012      SECTION  2005, ,      LAPAROSCOPIC CHOLECYSTECTOMY N/A 2019    Procedure: CHOLECYSTECTOMY, LAPAROSCOPIC;  Surgeon: Monty Sears MD;  Location: [facility] OR     LAPAROSCOPY DIAGNOSTIC (GENERAL)  , ?     endmetriosis     TUBAL LIGATION  2010    with c/s       PREVIOUS ENDOSCOPY:    None    ALLERGIES:   No Known Allergies    PERTINENT MEDICATIONS:    Current Outpatient Medications:      cetirizine (ZYRTEC) 10 MG tablet, Take 10 mg by mouth daily as needed for allergies, Disp: , Rfl:      ketotifen (ZADITOR/REFRESH ANTI-ITCH) 0.025 % ophthalmic solution, Place 1 drop into both eyes 2 times daily as needed for itching, Disp: , Rfl:      pimecrolimus (ELIDEL) 1 % external cream, Apply topically 2 times daily as needed (eczema), Disp: , Rfl:      HYDROcodone-acetaminophen (NORCO) 5-325 MG tablet, Take 1-2 tablets by mouth every 4 hours as needed (Moderate to Severe Pain) (Patient not taking: Reported on 8/14/2019), Disp: 20 tablet, Rfl: 0    SOCIAL HISTORY:  Social History     Socioeconomic History     Marital status:      Spouse name: Not on file     Number of children: Not on file     Years of education: Not on file     Highest education level: Not on file   Occupational History     Not on file   Social Needs     Financial resource strain: Not on file     Food insecurity:     Worry: Not on file     Inability: Not on file     Transportation needs:     Medical: Not on file     Non-medical: Not on file   Tobacco Use     Smoking status: Never Smoker     Smokeless tobacco: Never Used   Substance and Sexual Activity     Alcohol use: Yes     Drug use: No     Sexual activity: Yes   Lifestyle     Physical activity:     Days per week: Not on file     Minutes per session: Not on file     Stress: Not on file   Relationships     Social connections:     Talks on phone: Not on file     Gets together: Not on file     Attends [employer] service: Not on file     Active member of club or organization: Not on file     Attends meetings of clubs or organizations: Not on file     Relationship status: Not on file     Intimate partner violence:     Fear of current or ex partner: Not on file     Emotionally abused: Not on file      "Physically abused: Not on file     Forced sexual activity: Not on file   Other Topics Concern     Parent/sibling w/ CABG, MI or angioplasty before 65F 55M? Not Asked   Social History Narrative     Not on file       FAMILY HISTORY:  Family History   Problem Relation Age of Onset     Diabetes Father      Lipids Father      Cardiovascular Maternal Grandmother         CHF     Unknown/Adopted Maternal Grandfather      Unknown/Adopted Paternal Grandfather      Lipids Mother      Hypertension Mother      Hypertension Brother      Celiac Disease Daughter      Asthma No family hx of      C.A.D. No family hx of      Thyroid Disease No family hx of        Grandmother with IBD.  Mother was recently found to have polyps and advised by her gastroenterologist to have all her children start screening at 40.    PHYSICAL EXAMINATION:  Constitutional: aaox3, cooperative, pleasant, not dyspneic/diaphoretic, no acute distress  Vitals reviewed: /81   Pulse 71   Ht 1.626 m (5' 4\")   Wt 69.7 kg (153 lb 11.2 oz)   SpO2 98%   BMI 26.38 kg/m    Wt:   Wt Readings from Last 2 Encounters:   08/14/19 69.7 kg (153 lb 11.2 oz)   05/08/19 69.4 kg (153 lb)      Eyes: Sclera anicteric/injected  Ears/nose/mouth/throat: Normal oropharynx without ulcers or exudate, mucus membranes moist, hearing intact  Neck: supple, thyroid normal size  CV: No edema  Respiratory: Unlabored breathing  Lymph: No axillary, submandibular, supraclavicular or inguinal lymphadenopathy  Abd: Surgical scars noted.  Soft Nondistended, +bs, no hepatosplenomegaly, nontender, no peritoneal signs  Skin: warm, perfused, no jaundice  Psych: Normal affect  MSK: Normal gait      PERTINENT STUDIES:  Most recent CBC:  Recent Labs   Lab Test 05/08/19  1125 07/30/14  1113   WBC 8.0  --    HGB 13.4 12.9   HCT 40.1  --      --      Most recent hepatic panel:  Recent Labs   Lab Test 05/08/19  1125   ALT 86*   AST 49*     Most recent creatinine:  Recent Labs   Lab Test " 05/08/19  1125   CR 0.90       ASSESSMENT/PLAN:    1. Chronic diarrhea - no alarm symptoms.  Symptoms pre-dated her cholecystectomy - unsure if they are worse now.  Will check celiac panel and TSH. Given chronicity of symptoms, will not check stool culture/c-diff at this time.  Will start fiber supplements - can also add imodium as needed. If no improvement, can consider trial of cholestyramine.  Will also plan for colonoscopy with random biopsies to r/o microscopic/collagenous colitis    2. Family history of colon polyps in mother - ?if advanced adenomas - patient reports everyone in her family was advised to start screening at 40 - will plan for c-scope as above    3. Cholecystitis s/p cholecystectomy - transaminases mildly elevated during hospitalization (likely d/t acute gregory) - will re-check    RTC 3 months    Thank you for this consultation.  It was a pleasure to participate in the care of this patient; please contact us with any further questions.  A total of 30 minutes, face to face, was spent with this patient, >50% of which was counseling regarding the above delineated issues.    This note was created with voice recognition software, and while reviewed for accuracy, typos may remain.

## 2019-08-15 LAB
TTG IGA SER-ACNC: <1 U/ML
TTG IGG SER-ACNC: <1 U/ML

## 2019-09-11 RX ORDER — ONDANSETRON 2 MG/ML
4 INJECTION INTRAMUSCULAR; INTRAVENOUS
Status: CANCELLED | OUTPATIENT
Start: 2019-09-11

## 2019-09-11 RX ORDER — LIDOCAINE 40 MG/G
CREAM TOPICAL
Status: CANCELLED | OUTPATIENT
Start: 2019-09-11

## 2019-09-19 ENCOUNTER — SURGERY (OUTPATIENT)
Age: 49
End: 2019-09-19
Payer: COMMERCIAL

## 2019-09-19 ENCOUNTER — HOSPITAL ENCOUNTER (OUTPATIENT)
Facility: AMBULATORY SURGERY CENTER | Age: 49
Discharge: HOME | End: 2019-09-19
Attending: INTERNAL MEDICINE | Admitting: INTERNAL MEDICINE
Payer: COMMERCIAL

## 2019-09-19 VITALS
DIASTOLIC BLOOD PRESSURE: 86 MMHG | SYSTOLIC BLOOD PRESSURE: 117 MMHG | HEART RATE: 68 BPM | RESPIRATION RATE: 16 BRPM | OXYGEN SATURATION: 98 % | TEMPERATURE: 97 F

## 2019-09-19 LAB — COLONOSCOPY: NORMAL

## 2019-09-19 PROCEDURE — 45380 COLONOSCOPY AND BIOPSY: CPT | Mod: 59 | Performed by: INTERNAL MEDICINE

## 2019-09-19 PROCEDURE — G8907 PT DOC NO EVENTS ON DISCHARG: HCPCS

## 2019-09-19 PROCEDURE — G8918 PT W/O PREOP ORDER IV AB PRO: HCPCS

## 2019-09-19 PROCEDURE — 45385 COLONOSCOPY W/LESION REMOVAL: CPT

## 2019-09-19 PROCEDURE — 45380 COLONOSCOPY AND BIOPSY: CPT | Mod: XS

## 2019-09-19 PROCEDURE — 88305 TISSUE EXAM BY PATHOLOGIST: CPT | Performed by: INTERNAL MEDICINE

## 2019-09-19 PROCEDURE — 45385 COLONOSCOPY W/LESION REMOVAL: CPT | Performed by: INTERNAL MEDICINE

## 2019-09-19 RX ORDER — ONDANSETRON 2 MG/ML
4 INJECTION INTRAMUSCULAR; INTRAVENOUS EVERY 6 HOURS PRN
Status: DISCONTINUED | OUTPATIENT
Start: 2019-09-19 | End: 2019-09-20 | Stop reason: HOSPADM

## 2019-09-19 RX ORDER — ONDANSETRON 2 MG/ML
4 INJECTION INTRAMUSCULAR; INTRAVENOUS
Status: DISCONTINUED | OUTPATIENT
Start: 2019-09-19 | End: 2019-09-20 | Stop reason: HOSPADM

## 2019-09-19 RX ORDER — NALOXONE HYDROCHLORIDE 0.4 MG/ML
.1-.4 INJECTION, SOLUTION INTRAMUSCULAR; INTRAVENOUS; SUBCUTANEOUS
Status: DISCONTINUED | OUTPATIENT
Start: 2019-09-19 | End: 2019-09-20 | Stop reason: HOSPADM

## 2019-09-19 RX ORDER — ONDANSETRON 4 MG/1
4 TABLET, ORALLY DISINTEGRATING ORAL EVERY 6 HOURS PRN
Status: DISCONTINUED | OUTPATIENT
Start: 2019-09-19 | End: 2019-09-20 | Stop reason: HOSPADM

## 2019-09-19 RX ORDER — FENTANYL CITRATE 50 UG/ML
INJECTION, SOLUTION INTRAMUSCULAR; INTRAVENOUS PRN
Status: DISCONTINUED | OUTPATIENT
Start: 2019-09-19 | End: 2019-09-19 | Stop reason: HOSPADM

## 2019-09-19 RX ORDER — LIDOCAINE 40 MG/G
CREAM TOPICAL
Status: DISCONTINUED | OUTPATIENT
Start: 2019-09-19 | End: 2019-09-20 | Stop reason: HOSPADM

## 2019-09-19 RX ORDER — FLUMAZENIL 0.1 MG/ML
0.2 INJECTION, SOLUTION INTRAVENOUS
Status: SHIPPED | OUTPATIENT
Start: 2019-09-19 | End: 2019-09-19

## 2019-09-19 RX ADMIN — FENTANYL CITRATE 25 MCG: 50 INJECTION, SOLUTION INTRAMUSCULAR; INTRAVENOUS at 09:10

## 2019-09-19 RX ADMIN — FENTANYL CITRATE 25 MCG: 50 INJECTION, SOLUTION INTRAMUSCULAR; INTRAVENOUS at 09:17

## 2019-09-19 RX ADMIN — FENTANYL CITRATE 50 MCG: 50 INJECTION, SOLUTION INTRAMUSCULAR; INTRAVENOUS at 09:08

## 2019-09-23 LAB — COPATH REPORT: NORMAL

## 2019-11-13 ENCOUNTER — OFFICE VISIT (OUTPATIENT)
Dept: GASTROENTEROLOGY | Facility: CLINIC | Age: 49
End: 2019-11-13
Payer: COMMERCIAL

## 2019-11-13 VITALS
OXYGEN SATURATION: 100 % | WEIGHT: 154.7 LBS | SYSTOLIC BLOOD PRESSURE: 126 MMHG | HEART RATE: 77 BPM | BODY MASS INDEX: 26.41 KG/M2 | HEIGHT: 64 IN | DIASTOLIC BLOOD PRESSURE: 88 MMHG

## 2019-11-13 DIAGNOSIS — R19.7 DIARRHEA, UNSPECIFIED TYPE: Primary | ICD-10-CM

## 2019-11-13 PROCEDURE — 99213 OFFICE O/P EST LOW 20 MIN: CPT | Performed by: INTERNAL MEDICINE

## 2019-11-13 ASSESSMENT — MIFFLIN-ST. JEOR: SCORE: 1321.71

## 2019-11-13 NOTE — NURSING NOTE
"Shireen Bautista's goals for this visit include:   Chief Complaint   Patient presents with     RECHECK     follow up for loose stools - adding fiber has improved symptoms       She requests these members of her care team be copied on today's visit information:     PCP: Dominique Robbins    Referring Provider:  No referring provider defined for this encounter.    /88 (BP Location: Left arm, Patient Position: Sitting, Cuff Size: Adult Regular)   Pulse 77   Ht 1.626 m (5' 4\")   Wt 70.2 kg (154 lb 11.2 oz)   SpO2 100%   BMI 26.55 kg/m      Do you need any medication refills at today's visit? No    Kristina Baltazar CMA      "

## 2019-11-13 NOTE — PATIENT INSTRUCTIONS
Continue fiber supplements and imodium as needed. You can take imodium before meals as well.    Follow-up with me as needed.

## 2019-11-13 NOTE — PROGRESS NOTES
GI CLINIC VISIT    CC/REFERRING MD:  No ref. provider found  REASON FOR CONSULTATION:   No ref. provider found for   Chief Complaint   Patient presents with     RECHECK     follow up for loose stools - adding fiber has improved symptoms         HPI    Patient here today to follow-up diarrhea.  Symptoms improving with metamucil - generally has 1-2 BMs per day.  Hasn't been needing to use any imodium.  No abdominal pain.  No weight loss or blood in the stool.  Discussed results of colonoscopy with biopsies.     Of note, patient reports that her mother recently went in for a repeat colonoscopy and was told by her physician that based on polyps that she had in the past (although patient is unsure at what age that would have been) that her family members all needed to be screened early.    ROS:    No fevers or chills  No weight loss  No blurry vision, double vision or change in vision  No sore throat  No lymphadenopathy  No headache, paraesthesias, or weakness in a limb  No shortness of breath or wheezing  No chest pain or pressure  No arthralgias or myalgias  No rashes or skin changes  No odynophagia or dysphagia  No BRBPR, hematochezia, melena  No dysuria, frequency or urgency  No hot/cold intolerance or polyria  No anxiety or depression    PROBLEM LIST  Patient Active Problem List    Diagnosis Date Noted     Cholecystitis 2019     Priority: Medium     AD (atopic dermatitis) 10/30/2015     Priority: Medium       PERTINENT PAST MEDICAL HISTORY:  No past medical history on file.    PREVIOUS SURGERIES:  Past Surgical History:   Procedure Laterality Date     ABDOMINOPLASTY  2012      SECTION  , ,      COLONOSCOPY N/A 2019    Procedure: Colonoscopy, With Polypectomy And Biopsy;  Surgeon: Shahrzad Herring DO;  Location: [facility]     COLONOSCOPY WITH CO2 INSUFFLATION N/A 2019    Procedure: COLONOSCOPY, WITH CO2 INSUFFLATION;  Surgeon: Shahrzad Herring DO;  Location: [facility]     LAPAROSCOPIC  CHOLECYSTECTOMY N/A 5/8/2019    Procedure: CHOLECYSTECTOMY, LAPAROSCOPIC;  Surgeon: Monty Sears MD;  Location: [facility]     LAPAROSCOPY DIAGNOSTIC (GENERAL)  2004, ?2007    endmetriosis     TUBAL LIGATION  2010    with c/s       PREVIOUS ENDOSCOPY:  As above    ALLERGIES:   No Known Allergies    PERTINENT MEDICATIONS:    Current Outpatient Medications:      cetirizine (ZYRTEC) 10 MG tablet, Take 10 mg by mouth daily as needed for allergies, Disp: , Rfl:      ketotifen (ZADITOR/REFRESH ANTI-ITCH) 0.025 % ophthalmic solution, Place 1 drop into both eyes 2 times daily as needed for itching, Disp: , Rfl:      pimecrolimus (ELIDEL) 1 % external cream, Apply topically 2 times daily as needed (eczema), Disp: , Rfl:      psyllium (METAMUCIL/KONSYL) 58.6 % powder, Take by mouth daily, Disp: , Rfl:      HYDROcodone-acetaminophen (NORCO) 5-325 MG tablet, Take 1-2 tablets by mouth every 4 hours as needed (Moderate to Severe Pain) (Patient not taking: Reported on 8/14/2019), Disp: 20 tablet, Rfl: 0    SOCIAL HISTORY:  Social History     Socioeconomic History     Marital status:      Spouse name: Not on file     Number of children: Not on file     Years of education: Not on file     Highest education level: Not on file   Occupational History     Not on file   Social Needs     Financial resource strain: Not on file     Food insecurity:     Worry: Not on file     Inability: Not on file     Transportation needs:     Medical: Not on file     Non-medical: Not on file   Tobacco Use     Smoking status: Never Smoker     Smokeless tobacco: Never Used   Substance and Sexual Activity     Alcohol use: Yes     Drug use: No     Sexual activity: Yes   Lifestyle     Physical activity:     Days per week: Not on file     Minutes per session: Not on file     Stress: Not on file   Relationships     Social connections:     Talks on phone: Not on file     Gets together: Not on file     Attends Latter-day service: Not on file     Active  "member of club or organization: Not on file     Attends meetings of clubs or organizations: Not on file     Relationship status: Not on file     Intimate partner violence:     Fear of current or ex partner: Not on file     Emotionally abused: Not on file     Physically abused: Not on file     Forced sexual activity: Not on file   Other Topics Concern     Parent/sibling w/ CABG, MI or angioplasty before 65F 55M? Not Asked   Social History Narrative     Not on file       FAMILY HISTORY:  Family History   Problem Relation Age of Onset     Diabetes Father      Lipids Father      Cardiovascular Maternal Grandmother         CHF     Unknown/Adopted Maternal Grandfather      Unknown/Adopted Paternal Grandfather      Lipids Mother      Hypertension Mother      Hypertension Brother      Celiac Disease Daughter      Asthma No family hx of      C.A.D. No family hx of      Thyroid Disease No family hx of        Past/family/social history reviewed and no changes    PHYSICAL EXAMINATION:  Constitutional: aaox3, cooperative, pleasant, not dyspneic/diaphoretic, no acute distress  Vitals reviewed: /88 (BP Location: Left arm, Patient Position: Sitting, Cuff Size: Adult Regular)   Pulse 77   Ht 1.626 m (5' 4\")   Wt 70.2 kg (154 lb 11.2 oz)   SpO2 100%   BMI 26.55 kg/m    Wt:   Wt Readings from Last 2 Encounters:   11/13/19 70.2 kg (154 lb 11.2 oz)   08/14/19 69.7 kg (153 lb 11.2 oz)      Eyes: Sclera anicteric/injected  Ears/nose/mouth/throat: Normal oropharynx without ulcers or exudate, mucus membranes moist, hearing intact  Neck: supple, thyroid normal size  CV: No edema  Respiratory: Unlabored breathing  Lymph: No axillary, submandibular, supraclavicular or inguinal lymphadenopathy  Abd: Soft Nondistended, +bs, no hepatosplenomegaly, nontender, no peritoneal signs  Skin: warm, perfused, no jaundice  Psych: Normal affect  MSK: Normal gait      PERTINENT STUDIES:  Most recent CBC:  Recent Labs   Lab Test 05/08/19  1125 " 07/30/14  1113   WBC 8.0  --    HGB 13.4 12.9   HCT 40.1  --      --      Most recent hepatic panel:  Recent Labs   Lab Test 08/14/19  1059 05/08/19  1125   ALT 26 86*   AST 17 49*     Most recent creatinine:  Recent Labs   Lab Test 05/08/19  1125   CR 0.90       ASSESSMENT/PLAN:    1. Loose stools - improved with fiber supplements.  No evidence of microscopic colitis on random biopsies in the colon.  Celiac serologies negative.  Will continue fiber. Can also use imodium as needed.     2. ?family history of advanced adenoma - recent colonoscopy unremarkable aside from hyperplastic polyps.  Will need repeat c-scope in 10 years, unless advanced adenoma was found prior to age 60 in her mother or if there was concern for polyposis syndrome.  Discussed with patient who will discuss with her mother.     RTC as needed    Thank you for this consultation.  It was a pleasure to participate in the care of this patient; please contact us with any further questions.  A total of 30 minutes, face to face, was spent with this patient, >50% of which was counseling regarding the above delineated issues.    This note was created with voice recognition software, and while reviewed for accuracy, typos may remain.

## 2019-12-05 ENCOUNTER — DOCUMENTATION ONLY (OUTPATIENT)
Dept: FAMILY MEDICINE | Facility: CLINIC | Age: 49
End: 2019-12-05

## 2019-12-05 NOTE — PROGRESS NOTES
This patient has overdue labs. A letter was sent on 10/31/2019 and there has been no lab appointment made. If you still want these labs done, please have your care team contact the patient to make a lab appointment. Otherwise, please have the labs discontinued and close the encounter.    Thank you,  [facility]

## 2019-12-05 NOTE — PROGRESS NOTES
This patient has overdue labs. A letter was sent on 10/31/2019 and there has been no lab appointment made. If you still want these labs done, please have your care team contact the patient to make a lab appointment. Otherwise, please have the labs discontinued and close the encounter.    Thank you,  [facility]

## 2020-01-10 ENCOUNTER — TELEPHONE (OUTPATIENT)
Dept: FAMILY MEDICINE | Facility: CLINIC | Age: 50
End: 2020-01-10

## 2020-01-10 DIAGNOSIS — Z01.84 IMMUNITY STATUS TESTING: ICD-10-CM

## 2020-01-10 DIAGNOSIS — Z00.00 ENCOUNTER FOR ROUTINE ADULT HEALTH EXAMINATION WITHOUT ABNORMAL FINDINGS: ICD-10-CM

## 2020-01-10 DIAGNOSIS — N92.0 SPOTTING: Primary | ICD-10-CM

## 2020-01-10 NOTE — TELEPHONE ENCOUNTER
Patient  returned call    Best number to reach caller: Other phone number:  [phone] (    Is it ok to leave a detailed message: YES

## 2020-01-10 NOTE — TELEPHONE ENCOUNTER
This writer attempted to contact patient on 01/10/20      Reason for call return call and left message.      If patient calls back:   Registered Nurse called. Follow Triage Call workflow        Bri Caceres RN

## 2020-01-10 NOTE — TELEPHONE ENCOUNTER
Reason for Call:  Other call back    Detailed comments: Pt stated that she was supposed to have some blood work done for her physical from last April of 2019. Stated the letter she has was dated from 10/2019 but there is no order put in for her. She is wondering if she should still be coming in for the tests. She would like a call back to advise. Thank you.    Phone Number Patient can be reached at: Cell number on file:    Telephone Information:   Mobile [phone]       Best Time: Any    Can we leave a detailed message on this number? YES    Call taken on 1/10/2020 at 3:58 PM by Yaz Patriica

## 2020-01-10 NOTE — TELEPHONE ENCOUNTER
Discussed with patient which lab she was referring to She stated that it was fasting labs with Dr. Barlow.     Patient is wanting to know if future orders could be placed since she had a physical done in April 2019 and come in for a lab only visit.      Routing to provider for review    Bri Caceres RN

## 2020-01-13 NOTE — TELEPHONE ENCOUNTER
Per review of OV note from 4/2019 labs that had been ordered (and I think recently cancelled since patient did not come for labs after visit):  Lipid, gluc, cbc, ferr, tsh, hep b S miky, kidney, liver and electrolyte panel  Did get cmp, cbc completed 6/2019 so will not order those.       Re-ordered previously ordered labs that have not been completed this year.   Patient can schedule lab only visit

## 2020-01-13 NOTE — TELEPHONE ENCOUNTER
Called and discussed with Shireen that the repeat lab orders have been placed for her.     Help patient schedule a lab only apt for 01/29/2020.     Shireen has no questions at this time.     Bri Caceres RN

## 2020-02-06 DIAGNOSIS — Z00.00 ENCOUNTER FOR ROUTINE ADULT HEALTH EXAMINATION WITHOUT ABNORMAL FINDINGS: ICD-10-CM

## 2020-02-06 DIAGNOSIS — N92.0 SPOTTING: ICD-10-CM

## 2020-02-06 DIAGNOSIS — Z01.84 IMMUNITY STATUS TESTING: ICD-10-CM

## 2020-02-06 LAB
CHOLEST SERPL-MCNC: 200 MG/DL
FERRITIN SERPL-MCNC: 24 NG/ML (ref 8–252)
GLUCOSE SERPL-MCNC: 97 MG/DL (ref 70–99)
HBV SURFACE AB SERPL IA-ACNC: 1.31 M[IU]/ML
HDLC SERPL-MCNC: 38 MG/DL
LDLC SERPL CALC-MCNC: 107 MG/DL
NONHDLC SERPL-MCNC: 162 MG/DL
TRIGL SERPL-MCNC: 274 MG/DL
TSH SERPL DL<=0.005 MIU/L-ACNC: 1.28 MU/L (ref 0.4–4)

## 2020-02-06 PROCEDURE — 82728 ASSAY OF FERRITIN: CPT | Performed by: FAMILY MEDICINE

## 2020-02-06 PROCEDURE — 82947 ASSAY GLUCOSE BLOOD QUANT: CPT | Performed by: FAMILY MEDICINE

## 2020-02-06 PROCEDURE — 80061 LIPID PANEL: CPT | Performed by: FAMILY MEDICINE

## 2020-02-06 PROCEDURE — 86706 HEP B SURFACE ANTIBODY: CPT | Performed by: FAMILY MEDICINE

## 2020-02-06 PROCEDURE — 84443 ASSAY THYROID STIM HORMONE: CPT | Performed by: FAMILY MEDICINE

## 2020-02-06 PROCEDURE — 36415 COLL VENOUS BLD VENIPUNCTURE: CPT | Performed by: FAMILY MEDICINE

## 2023-01-01 ENCOUNTER — TRANSFERRED RECORDS (OUTPATIENT)
Dept: MULTI SPECIALTY CLINIC | Facility: CLINIC | Age: 53
End: 2023-01-01

## 2023-01-01 LAB — PAP SMEAR - HIM PATIENT REPORTED: NEGATIVE

## 2023-01-20 ENCOUNTER — TRANSFERRED RECORDS (OUTPATIENT)
Dept: HEALTH INFORMATION MANAGEMENT | Facility: CLINIC | Age: 53
End: 2023-01-20

## 2023-02-17 ENCOUNTER — TELEPHONE (OUTPATIENT)
Dept: TRANSPLANT | Facility: CLINIC | Age: 53
End: 2023-02-17
Payer: COMMERCIAL

## 2023-02-17 NOTE — TELEPHONE ENCOUNTER
"Donor Intake Start: 23 Donor Intake Complete: 23  Gender: Female Preferred Language: English  Full Name: Shireen Bautista Is  Needed: [not answered]  E-mail: [email or web address] Phone Number: [phone]  Secondary Phone:  Contact Preference: [not answered] Best Contact Time: 9am - 5pm  Emergency Contact: Manan Santiagoari Emergency Contact #: [phone]  Relationship to Contact: Contact is my spouse  : 74 Age: 52  Address: [address] [place] [address] City: [place]  State: [place] Postal Code: [id number]  Height: 5'4\" Weight: 157lbs  BMI: 26.9  Employment Status: Employed Has PTO for donation? No, not reimbursed  Occupation:  Requires Heavy Lifting? No  Education Level: Advanced Degree Marital Status:   Exercise Routine: Occasional Health Insurance: Yes  Blood Type: A Ethnicity/Race: White  Donor Type: Standard Voucher Donor  Prefer Remote Donation: [not answered]  Physician: Faye Escoto<br/>[place],  [place]  Donating for Local Recipient  Recipient's Name: Jerson Barnett Recipient's : [phone]  Recipient's Status: Patient not on dialysis but  needs a transplant soon.  How Candidate Knows Recip: Child Of Patient  Candidate communicates w/  Recip: Several Times Per Week  Possible Interest In:  Motivation to donate: A donation will increase my father's life expectancy.  Living Donor Pre-Screening  [place]  Will accept blood transfusions? Yes  Has been diagnosed with kidney disease? No  Has had a heart attack? No  Has Diabetes (High BGs)? No  Has had cancer? No  Has had kidney stones? No  Has ever been pregnant? Yes   - Is Currently Pregnant? No   - Months Since Pregnant? 24+   - Is Currently Nursing? No   - Had gestational diabetes? Yes   - Hypertension during pregnancy? Never  Is Planning on Pregnancy? No  Is Taking Birth Control? No  Has Used Tobacco? No  Has HIV? No  Is Currently Incarcerated? No  Is Currently Residing in [place]  History Misc  Has Allergies? " Yes  Allergy  Mold, Seasonal  Has had Surgeries? Yes  Surgery When  Gall Bladder Removal 2019  Abdominoplasty 2013  Laproscopy for endomitriosis 2004, 2007   2005, 2008, 2010  Takes Medication? No  Medical History  History of High BP? Never  History of CABG (bypass surgery)? No  History of blood clots? Never  History of coronary disease? Never  History of high cholesterol or triglycerides? Unknown  Has stents implanted? No  History of chest pain during exercise? Yes  History of chest pain at other times? Yes  Results of climbing 2 flights of stairs? Shortness Of Breath  Had stress test in last year? No  Has had stroke? No  Has had leg bypass? No  History of lung disease? Never  History of COPD? Never  History of TB? Never  History of Pneumonia? Never  Has respiratory issues? No  Has HIV? No  Has Gastro Issues? No  History of Gallstones? Treated in past  History of Pancreatitis? Never  History of Liver Disease? Never  History of Hepatitis B? Never  History of Hepatitis C? Never  History of bleeding problems? Never  History of UTIs? Yes   - UTI episodes: 5   - years since last UTI: 1 years  History of kidney damage? Never  History of Proteinuria? Unknown  History of Hematuria? Never  History of neuro disease? Never  History of seizure? Never  History of lupus? Never  History of paralysis? Never  History of arthritis? Never  History of neuropathy? Never  History of depression? Never  History of anxiety? Never  History of documented psychiatric illness? Never  History of Fibroid Uterus? Never  History of Endometriosis? Treated in past  History of Polycystic Ovaries? Unknown  Has had Miscarriages? No  Has had abortions? No  Has had transfusions? No  History of Obesity? No  History of Fabry's Disease? No  History of Sickle Cell Disease? No  History of Sickle Cell Trait? No  History of Sarcoidosis? No  Has auto-immune disease? No  Has had Physical Exam? Yes   - how many years ago: 2  Has had Mammogram? Yes   - how  many years ago: 2  Has had Pap Smear? Yes   - how many years ago: 1  Colonoscopy? Yes   - how many years ago: 2  Medical history comments? Abdominoplasty (following twins)  Living Donor Family Medical History  Anyone with kidney disease? Yes   - which family members: Father  Anyone with liver disease? No  Anyone with heart disease? Yes   - which family members: Paternal grandfather, Maternal  grandmother, Father  Anyone with coronary artery disease? Unknown  Anyone with high blood pressure? Yes   - which family members: Mother  Anyone with blood disorder? No  Anyone with cancer? No  Anyone with kidney cancer? No  Anyone with diabetes? Yes   - which family members: Father  Is mother alive? Yes  Mother's age? 72  Is father alive? Yes  Father's age? 75  How many siblings? 3  How many adult children? 0  How many children under 18? 4  Social History  Has Used Alcohol? Yes   - currently uses alcohol: Yes   - how much: 6/Monthly  Has Abused Alcohol? No  Has Used Drugs? No  Has had legal issues w/ law enforcement? No  Traveled over 100 miles from home in last year? Yes   - Traveled Where?  [place], [place]; [place],  [place]; [place], [place]; [place],  [place]; [place], [place]; [place], [place];  [facility]  Has had suicidal thoughts or attempts in the last five years? No   5

## 2023-02-20 ENCOUNTER — DOCUMENTATION ONLY (OUTPATIENT)
Dept: TRANSPLANT | Facility: CLINIC | Age: 53
End: 2023-02-20
Payer: COMMERCIAL

## 2023-02-22 ENCOUNTER — DOCUMENTATION ONLY (OUTPATIENT)
Dept: TRANSPLANT | Facility: CLINIC | Age: 53
End: 2023-02-22
Payer: COMMERCIAL

## 2023-02-27 ENCOUNTER — ANCILLARY PROCEDURE (OUTPATIENT)
Dept: MAMMOGRAPHY | Facility: CLINIC | Age: 53
End: 2023-02-27
Attending: FAMILY MEDICINE
Payer: COMMERCIAL

## 2023-02-27 ENCOUNTER — TELEPHONE (OUTPATIENT)
Dept: TRANSPLANT | Facility: CLINIC | Age: 53
End: 2023-02-27

## 2023-02-27 DIAGNOSIS — Z12.31 VISIT FOR SCREENING MAMMOGRAM: ICD-10-CM

## 2023-02-27 PROCEDURE — 77067 SCR MAMMO BI INCL CAD: CPT

## 2023-02-27 PROCEDURE — 77063 BREAST TOMOSYNTHESIS BI: CPT

## 2023-02-27 NOTE — TELEPHONE ENCOUNTER
Initial Independent Living Donor Advocate contact made with potential donor today.  I introduced myself and my role during the donation process, includin.  STEPH ROLE   The federal government requires that all licensed transplant centers provide the living donor with an Independent Living Donor Advocate (STEPH).  I do not meet recipients or attend meetings that discuss their care or decision to transplant them. My role is separate to avoid any conflict of interest.  My role is to ensure:  1) your rights are protected;  2) you get all the information you need from the transplant team to make a fully informed decision whether to donate;   3) that living donation is in your best interest.   4) that you have the right to decide NOT to go forward with living donation at any time during this process.  I am available to you throughout the workup, during surgery phase and follow-up at home.     2. WORKUP & PRIVACY     Your identity and workup are not shared with the recipient at any time.     The recipient's insurance covers the medical expenses related to the donor evaluation and surgery.  However, it is important that you carry your own health insurance to address any medical issues that are found and are NOT related to living donation.  Additionally, age appropriate cancer screening (I.e. mammograms,  colonoscopies, etc) is required and would be through your insurance.    There is a psychosocial and medical donor workup that consists of testing to determine if you are healthy enough to donate. Workup tests include tissue typing/genetics, many blood draws, urine collection/ (kidney function testing), chest x-ray, EKG/other heart testing, CT scan. Age appropriate cancer screening is required and would be through your insurance. As you complete each step then you may move on to the next.  Workup can take as little or as long as you need and you can stop the process at any time. Transplant is a treatment option, not a  cure. A kidney from a living kidney donor can last 12-14 years.  Other treatment options are  donation and two types of dialysis.     This is major surgery and your estimated hospital stay is approximately 1-2 nights.  After surgery, there are driving and lifting restrictions - no driving for two weeks and no lifting over ten pounds for 8 weeks.  Donors are routinely off from work for 4 - 6 weeks after surgery, and potentially longer if they have a physical job.       If you anticipate lost wages due to donation, donor wage reimbursement options may be available to you and will be reviewed with you during the evaluation process. Donor Shield and NLDAC explained.    We reviewed the importance of completing follow-up labs and surveys at six months, 1 year and 2 years after donation to monitor kidney health and the impact donation has had on their life post donation.       3.  QUESTIONS    Have you received the Welcome e-mail that includes copies of the informed consent, financial letter, information on donor shield and NLDAC from the transplant department? Yes. Questions? Yes.    Have you discussed with anyone your potential decision to donate?   Yes.    Is anyone pressuring or coercing you to donate? No.    Have you discussed any financial arrangements with recipient around donating a kidney? No.    Are you aware that you can confidentially opt out at any time, up to and including day of donation? Yes.    At this time, would you like to proceed with the medical evaluation to see if you can be a kidney donor?  Yes.    If yes, I will make an appointment for your donor coordinator to reach out to you with next steps.     Contact information for STEPH's was provided Yes.    Jyoti Morrell- [phone]  Marbella Apple- [phone]      Time frame for donation: open  Paired exchange was introduced  No.      MyChart was initiated  No.  CareEverywhere was initiated No.    STEPH NOTES: Shireen wants to help her father. Her  donor nurse coordinator is Osiris Martinez RN.  She is scheduled to speak to her on Monday, March 6, 2023 at 10 am      Duration of call 30 minutes

## 2023-03-06 ENCOUNTER — TELEPHONE (OUTPATIENT)
Dept: TRANSPLANT | Facility: CLINIC | Age: 53
End: 2023-03-06
Payer: COMMERCIAL

## 2023-03-06 NOTE — TELEPHONE ENCOUNTER
Contacted Shireen Bautista to introduce myself and my role, review of medical/surgical/family history and next steps.     Shireen Bautista  is aware She can stop donor evaluation at any time.    Have you ever been positive for COVID 19? Yes    Have you received the COVID vaccination series? Yes     Reviewed risk of COVID-19 to living donors.    Regular blood donor? Yes Last blood donation date 2019  (Notified donor to avoid blood donation at this time to get accurate blood counts if going through evaluation)     Shireen Bautista is a 52 year old female  ABO A that would like to learn more about kidney donation to her father.     Concerns from medical/surgical/family history: Gall bladder removal,  x3, abdominoplasty    Current medications and NSAID use: Only takes vitamins, some aleve     Legal issues w/ law enforcement: no    Reviewed any history of travel in endemic areas: [place], [place], [place], [place]  Strongyloides- [place], [place] and [place].  Trypanosoma cruzi ([place])- [place]  West Nile Virus- [place], [place], [place], [place] and [place].     Per our Phase 1 algorithm, does not meet criteria to do preliminary testing.     Reviewed evaluation testing: Iohexol, Lab work, CXR, EKG, Provider visits and functions, CT Angiogram.     Reviewed operations of selection committee and angio review meetings and the need for multidisciplinary input. Post-donation requirements include post-op appointment with your surgeon at 2 weeks after surgery, 6 week, 6 month, 1 year and 2 year lab tests.     Reviewed NKR listing and transfer of care to [facility] team if approved. Provided Shireen with NKR website to review.     Briefly went over options if approved of NDD and voucher donation.     Shireen would like to proceed with next steps: evaluation on 3/17     Confirmed that Shireen reviewed Informed consent document and all questions answered.  Reviewed that they will receive Docusign to  obtain electronic signature for the following: Informed consent, SRTR data, RADHA for medical information, Auth for Electronic communication and will need their signed consent back before proceeding with evaluation.      Encouraged sign up for MyChart and reviewed importance of watching teaching videos prior to evaluation.     Verified recipient status if not NDD.    Donor timeline: TBD     Will send orders to scheduling team to set up for evaluation testing.

## 2023-03-07 DIAGNOSIS — Z00.5 TRANSPLANT DONOR EVALUATION: Primary | ICD-10-CM

## 2023-03-07 RX ORDER — ALBUTEROL SULFATE 90 UG/1
1-2 AEROSOL, METERED RESPIRATORY (INHALATION)
Status: CANCELLED
Start: 2023-03-17

## 2023-03-07 RX ORDER — DIPHENHYDRAMINE HYDROCHLORIDE 50 MG/ML
50 INJECTION INTRAMUSCULAR; INTRAVENOUS
Status: CANCELLED
Start: 2023-03-17

## 2023-03-07 RX ORDER — EPINEPHRINE 1 MG/ML
0.3 INJECTION, SOLUTION, CONCENTRATE INTRAVENOUS EVERY 5 MIN PRN
Status: CANCELLED | OUTPATIENT
Start: 2023-03-17

## 2023-03-07 RX ORDER — MEPERIDINE HYDROCHLORIDE 25 MG/ML
25 INJECTION INTRAMUSCULAR; INTRAVENOUS; SUBCUTANEOUS EVERY 30 MIN PRN
Status: CANCELLED | OUTPATIENT
Start: 2023-03-17

## 2023-03-07 RX ORDER — ALBUTEROL SULFATE 0.83 MG/ML
2.5 SOLUTION RESPIRATORY (INHALATION)
Status: CANCELLED | OUTPATIENT
Start: 2023-03-17

## 2023-03-07 RX ORDER — METHYLPREDNISOLONE SODIUM SUCCINATE 125 MG/2ML
125 INJECTION, POWDER, LYOPHILIZED, FOR SOLUTION INTRAMUSCULAR; INTRAVENOUS
Status: CANCELLED
Start: 2023-03-17

## 2023-03-15 ENCOUNTER — MEDICAL CORRESPONDENCE (OUTPATIENT)
Dept: HEALTH INFORMATION MANAGEMENT | Facility: CLINIC | Age: 53
End: 2023-03-15
Payer: COMMERCIAL

## 2023-03-15 LAB
A1 AG RBC QL: POSITIVE
ABO/RH(D): NORMAL
ANTIBODY SCREEN: NEGATIVE
SPECIMEN EXPIRATION DATE: NORMAL
SPECIMEN EXPIRATION DATE: NORMAL

## 2023-03-16 ENCOUNTER — ANCILLARY PROCEDURE (OUTPATIENT)
Dept: GENERAL RADIOLOGY | Facility: CLINIC | Age: 53
End: 2023-03-16
Attending: INTERNAL MEDICINE
Payer: COMMERCIAL

## 2023-03-16 ENCOUNTER — OFFICE VISIT (OUTPATIENT)
Dept: NEPHROLOGY | Facility: CLINIC | Age: 53
End: 2023-03-16
Attending: INTERNAL MEDICINE

## 2023-03-16 ENCOUNTER — LAB (OUTPATIENT)
Dept: LAB | Facility: CLINIC | Age: 53
End: 2023-03-16

## 2023-03-16 ENCOUNTER — ANCILLARY PROCEDURE (OUTPATIENT)
Dept: CT IMAGING | Facility: CLINIC | Age: 53
End: 2023-03-16
Attending: INTERNAL MEDICINE
Payer: COMMERCIAL

## 2023-03-16 ENCOUNTER — ALLIED HEALTH/NURSE VISIT (OUTPATIENT)
Dept: TRANSPLANT | Facility: CLINIC | Age: 53
End: 2023-03-16
Attending: INTERNAL MEDICINE

## 2023-03-16 ENCOUNTER — APPOINTMENT (OUTPATIENT)
Dept: TRANSPLANT | Facility: CLINIC | Age: 53
End: 2023-03-16
Attending: INTERNAL MEDICINE

## 2023-03-16 ENCOUNTER — ALLIED HEALTH/NURSE VISIT (OUTPATIENT)
Dept: TRANSPLANT | Facility: CLINIC | Age: 53
End: 2023-03-16
Attending: INTERNAL MEDICINE
Payer: COMMERCIAL

## 2023-03-16 ENCOUNTER — OFFICE VISIT (OUTPATIENT)
Dept: INFUSION THERAPY | Facility: CLINIC | Age: 53
End: 2023-03-16
Attending: INTERNAL MEDICINE

## 2023-03-16 ENCOUNTER — OFFICE VISIT (OUTPATIENT)
Dept: TRANSPLANT | Facility: CLINIC | Age: 53
End: 2023-03-16
Attending: INTERNAL MEDICINE

## 2023-03-16 ENCOUNTER — LAB (OUTPATIENT)
Dept: LAB | Facility: CLINIC | Age: 53
End: 2023-03-16
Attending: INTERNAL MEDICINE

## 2023-03-16 VITALS
HEART RATE: 81 BPM | SYSTOLIC BLOOD PRESSURE: 138 MMHG | DIASTOLIC BLOOD PRESSURE: 90 MMHG | OXYGEN SATURATION: 99 % | TEMPERATURE: 98.2 F | BODY MASS INDEX: 27.49 KG/M2 | HEIGHT: 64 IN | WEIGHT: 161 LBS

## 2023-03-16 VITALS
HEART RATE: 76 BPM | RESPIRATION RATE: 16 BRPM | WEIGHT: 161.8 LBS | SYSTOLIC BLOOD PRESSURE: 145 MMHG | BODY MASS INDEX: 27.77 KG/M2 | TEMPERATURE: 98.4 F | DIASTOLIC BLOOD PRESSURE: 84 MMHG | OXYGEN SATURATION: 98 %

## 2023-03-16 DIAGNOSIS — D50.9 IRON DEFICIENCY ANEMIA, UNSPECIFIED IRON DEFICIENCY ANEMIA TYPE: ICD-10-CM

## 2023-03-16 DIAGNOSIS — Z00.5 TRANSPLANT DONOR EVALUATION: ICD-10-CM

## 2023-03-16 DIAGNOSIS — Z00.5 TRANSPLANT DONOR EVALUATION: Primary | ICD-10-CM

## 2023-03-16 DIAGNOSIS — R03.0 ELEVATED BLOOD PRESSURE READING IN OFFICE WITHOUT DIAGNOSIS OF HYPERTENSION: ICD-10-CM

## 2023-03-16 PROBLEM — K81.9 CHOLECYSTITIS: Status: RESOLVED | Noted: 2019-05-08 | Resolved: 2023-03-16

## 2023-03-16 PROBLEM — D50.0 IRON DEFICIENCY ANEMIA DUE TO CHRONIC BLOOD LOSS: Status: ACTIVE | Noted: 2023-03-16

## 2023-03-16 LAB
ABO/RH(D): NORMAL
ALBUMIN MFR UR ELPH: 7.3 MG/DL (ref 1–14)
ALBUMIN SERPL BCG-MCNC: 4.4 G/DL (ref 3.5–5.2)
ALBUMIN UR-MCNC: NEGATIVE MG/DL
ALP SERPL-CCNC: 50 U/L (ref 35–104)
ALT SERPL W P-5'-P-CCNC: 20 U/L (ref 10–35)
ANION GAP SERPL CALCULATED.3IONS-SCNC: 9 MMOL/L (ref 7–15)
APPEARANCE UR: CLEAR
APTT PPP: 29 SECONDS (ref 22–38)
AST SERPL W P-5'-P-CCNC: 19 U/L (ref 10–35)
BILIRUB SERPL-MCNC: 0.4 MG/DL
BILIRUB UR QL STRIP: NEGATIVE
BUN SERPL-MCNC: 12.2 MG/DL (ref 6–20)
CALCIUM SERPL-MCNC: 9.4 MG/DL (ref 8.6–10)
CHLORIDE SERPL-SCNC: 107 MMOL/L (ref 98–107)
CHOLEST SERPL-MCNC: 200 MG/DL
CMV IGG SERPL IA-ACNC: 4.8 U/ML
CMV IGG SERPL IA-ACNC: ABNORMAL
COLOR UR AUTO: ABNORMAL
CREAT SERPL-MCNC: 0.91 MG/DL (ref 0.51–0.95)
CREAT UR-MCNC: 151 MG/DL
CREAT UR-MCNC: 152 MG/DL
DEPRECATED HCO3 PLAS-SCNC: 25 MMOL/L (ref 22–29)
EBV VCA IGG SER IA-ACNC: >750 U/ML
EBV VCA IGG SER IA-ACNC: POSITIVE
ERYTHROCYTE [DISTWIDTH] IN BLOOD BY AUTOMATED COUNT: 13.5 % (ref 10–15)
FERRITIN SERPL-MCNC: 13 NG/ML (ref 6–175)
GFR SERPL CREATININE-BSD FRML MDRD: 76 ML/MIN/1.73M2
GLUCOSE SERPL-MCNC: 101 MG/DL (ref 70–99)
GLUCOSE UR STRIP-MCNC: NEGATIVE MG/DL
HBA1C MFR BLD: 5.3 %
HBV CORE AB SERPL QL IA: NONREACTIVE
HBV SURFACE AB SERPL IA-ACNC: 0.43 M[IU]/ML
HBV SURFACE AB SERPL IA-ACNC: NONREACTIVE M[IU]/ML
HBV SURFACE AG SERPL QL IA: NONREACTIVE
HCG INTACT+B SERPL-ACNC: <1 MIU/ML
HCT VFR BLD AUTO: 36.8 % (ref 35–47)
HCV AB SERPL QL IA: NONREACTIVE
HDLC SERPL-MCNC: 35 MG/DL
HGB BLD-MCNC: 11.6 G/DL (ref 11.7–15.7)
HGB UR QL STRIP: NEGATIVE
HIV 1+2 AB+HIV1 P24 AG SERPL QL IA: NONREACTIVE
INR PPP: 1.09 (ref 0.85–1.15)
IRON BINDING CAPACITY (ROCHE): 417 UG/DL (ref 240–430)
IRON SATN MFR SERPL: 16 % (ref 15–46)
IRON SERPL-MCNC: 66 UG/DL (ref 37–145)
KETONES UR STRIP-MCNC: NEGATIVE MG/DL
LDLC SERPL CALC-MCNC: 121 MG/DL
LEUKOCYTE ESTERASE UR QL STRIP: NEGATIVE
MCH RBC QN AUTO: 27.6 PG (ref 26.5–33)
MCHC RBC AUTO-ENTMCNC: 31.5 G/DL (ref 31.5–36.5)
MCV RBC AUTO: 88 FL (ref 78–100)
MICROALBUMIN UR-MCNC: <12 MG/L
MICROALBUMIN/CREAT UR: NORMAL MG/G{CREAT}
MUCOUS THREADS #/AREA URNS LPF: PRESENT /LPF
NITRATE UR QL: NEGATIVE
NONHDLC SERPL-MCNC: 165 MG/DL
PH UR STRIP: 5.5 [PH] (ref 5–7)
PHOSPHATE SERPL-MCNC: 2.9 MG/DL (ref 2.5–4.5)
PLATELET # BLD AUTO: 242 10E3/UL (ref 150–450)
POTASSIUM SERPL-SCNC: 3.6 MMOL/L (ref 3.4–5.3)
PROT SERPL-MCNC: 6.9 G/DL (ref 6.4–8.3)
PROT/CREAT 24H UR: 0.05 MG/MG CR (ref 0–0.2)
RBC # BLD AUTO: 4.2 10E6/UL (ref 3.8–5.2)
RBC URINE: 1 /HPF
SODIUM SERPL-SCNC: 141 MMOL/L (ref 136–145)
SP GR UR STRIP: 1.02 (ref 1–1.03)
SPECIMEN EXPIRATION DATE: NORMAL
SQUAMOUS EPITHELIAL: 10 /HPF
T PALLIDUM AB SER QL: NONREACTIVE
TRIGL SERPL-MCNC: 220 MG/DL
URATE SERPL-MCNC: 5.8 MG/DL (ref 2.4–5.7)
UROBILINOGEN UR STRIP-MCNC: NORMAL MG/DL
VIT B12 SERPL-MCNC: 369 PG/ML (ref 232–1245)
WBC # BLD AUTO: 6.3 10E3/UL (ref 4–11)
WBC URINE: 1 /HPF

## 2023-03-16 PROCEDURE — 82728 ASSAY OF FERRITIN: CPT

## 2023-03-16 PROCEDURE — 86788 WEST NILE VIRUS AB IGM: CPT

## 2023-03-16 PROCEDURE — 86644 CMV ANTIBODY: CPT

## 2023-03-16 PROCEDURE — 86481 TB AG RESPONSE T-CELL SUSP: CPT

## 2023-03-16 PROCEDURE — 83550 IRON BINDING TEST: CPT

## 2023-03-16 PROCEDURE — 85027 COMPLETE CBC AUTOMATED: CPT

## 2023-03-16 PROCEDURE — 999N000248 HC STATISTIC IV INSERT WITH US BY RN

## 2023-03-16 PROCEDURE — 86704 HEP B CORE ANTIBODY TOTAL: CPT

## 2023-03-16 PROCEDURE — 86753 PROTOZOA ANTIBODY NOS: CPT

## 2023-03-16 PROCEDURE — 85610 PROTHROMBIN TIME: CPT

## 2023-03-16 PROCEDURE — 86789 WEST NILE VIRUS ANTIBODY: CPT

## 2023-03-16 PROCEDURE — 81378 HLA I & II TYPING HR: CPT

## 2023-03-16 PROCEDURE — 36415 COLL VENOUS BLD VENIPUNCTURE: CPT

## 2023-03-16 PROCEDURE — 86803 HEPATITIS C AB TEST: CPT

## 2023-03-16 PROCEDURE — 86706 HEP B SURFACE ANTIBODY: CPT

## 2023-03-16 PROCEDURE — 99205 OFFICE O/P NEW HI 60 MIN: CPT | Performed by: INTERNAL MEDICINE

## 2023-03-16 PROCEDURE — 84702 CHORIONIC GONADOTROPIN TEST: CPT

## 2023-03-16 PROCEDURE — 82607 VITAMIN B-12: CPT

## 2023-03-16 PROCEDURE — 80061 LIPID PANEL: CPT

## 2023-03-16 PROCEDURE — 36415 COLL VENOUS BLD VENIPUNCTURE: CPT | Performed by: SURGERY

## 2023-03-16 PROCEDURE — G0463 HOSPITAL OUTPT CLINIC VISIT: HCPCS | Performed by: SURGERY

## 2023-03-16 PROCEDURE — 74175 CTA ABDOMEN W/CONTRAST: CPT | Mod: GC | Performed by: RADIOLOGY

## 2023-03-16 PROCEDURE — 83036 HEMOGLOBIN GLYCOSYLATED A1C: CPT

## 2023-03-16 PROCEDURE — 84100 ASSAY OF PHOSPHORUS: CPT

## 2023-03-16 PROCEDURE — 80053 COMPREHEN METABOLIC PANEL: CPT

## 2023-03-16 PROCEDURE — 87340 HEPATITIS B SURFACE AG IA: CPT

## 2023-03-16 PROCEDURE — 86682 HELMINTH ANTIBODY: CPT

## 2023-03-16 PROCEDURE — 82570 ASSAY OF URINE CREATININE: CPT

## 2023-03-16 PROCEDURE — 84156 ASSAY OF PROTEIN URINE: CPT

## 2023-03-16 PROCEDURE — 82542 COL CHROMOTOGRAPHY QUAL/QUAN: CPT | Performed by: SURGERY

## 2023-03-16 PROCEDURE — 99207 PR NO CHARGE COORDINATED CARE PS: CPT | Performed by: SOCIAL WORKER

## 2023-03-16 PROCEDURE — 71046 X-RAY EXAM CHEST 2 VIEWS: CPT | Mod: GC | Performed by: RADIOLOGY

## 2023-03-16 PROCEDURE — 99207 PR NO CHARGE COORDINATED CARE PS: CPT

## 2023-03-16 PROCEDURE — 86901 BLOOD TYPING SEROLOGIC RH(D): CPT

## 2023-03-16 PROCEDURE — 86850 RBC ANTIBODY SCREEN: CPT

## 2023-03-16 PROCEDURE — 86905 BLOOD TYPING RBC ANTIGENS: CPT

## 2023-03-16 PROCEDURE — 86665 EPSTEIN-BARR CAPSID VCA: CPT

## 2023-03-16 PROCEDURE — 255N000002 HC RX 255 OP 636: Performed by: INTERNAL MEDICINE

## 2023-03-16 PROCEDURE — 81001 URINALYSIS AUTO W/SCOPE: CPT

## 2023-03-16 PROCEDURE — 85730 THROMBOPLASTIN TIME PARTIAL: CPT

## 2023-03-16 PROCEDURE — 86780 TREPONEMA PALLIDUM: CPT

## 2023-03-16 PROCEDURE — 99205 OFFICE O/P NEW HI 60 MIN: CPT | Performed by: SURGERY

## 2023-03-16 PROCEDURE — 84550 ASSAY OF BLOOD/URIC ACID: CPT

## 2023-03-16 RX ORDER — METHYLPREDNISOLONE SODIUM SUCCINATE 125 MG/2ML
125 INJECTION, POWDER, LYOPHILIZED, FOR SOLUTION INTRAMUSCULAR; INTRAVENOUS
Status: CANCELLED
Start: 2023-03-16

## 2023-03-16 RX ORDER — MEPERIDINE HYDROCHLORIDE 25 MG/ML
25 INJECTION INTRAMUSCULAR; INTRAVENOUS; SUBCUTANEOUS EVERY 30 MIN PRN
Status: CANCELLED | OUTPATIENT
Start: 2023-03-16

## 2023-03-16 RX ORDER — IOPAMIDOL 755 MG/ML
100 INJECTION, SOLUTION INTRAVASCULAR ONCE
Status: COMPLETED | OUTPATIENT
Start: 2023-03-16 | End: 2023-03-16

## 2023-03-16 RX ORDER — ALBUTEROL SULFATE 90 UG/1
1-2 AEROSOL, METERED RESPIRATORY (INHALATION)
Status: CANCELLED
Start: 2023-03-16

## 2023-03-16 RX ORDER — MULTIPLE VITAMINS W/ MINERALS TAB 9MG-400MCG
1 TAB ORAL DAILY
COMMUNITY

## 2023-03-16 RX ORDER — DIPHENHYDRAMINE HYDROCHLORIDE 50 MG/ML
50 INJECTION INTRAMUSCULAR; INTRAVENOUS
Status: CANCELLED
Start: 2023-03-16

## 2023-03-16 RX ORDER — ALBUTEROL SULFATE 0.83 MG/ML
2.5 SOLUTION RESPIRATORY (INHALATION)
Status: CANCELLED | OUTPATIENT
Start: 2023-03-16

## 2023-03-16 RX ORDER — EPINEPHRINE 1 MG/ML
0.3 INJECTION, SOLUTION INTRAMUSCULAR; SUBCUTANEOUS EVERY 5 MIN PRN
Status: CANCELLED | OUTPATIENT
Start: 2023-03-16

## 2023-03-16 RX ADMIN — IOPAMIDOL 100 ML: 755 INJECTION, SOLUTION INTRAVASCULAR at 13:13

## 2023-03-16 RX ADMIN — IOHEXOL 4 ML: 350 INJECTION, SOLUTION INTRAVENOUS at 08:17

## 2023-03-16 ASSESSMENT — PAIN SCALES - GENERAL: PAINLEVEL: NO PAIN (0)

## 2023-03-16 NOTE — PROGRESS NOTES
TRANSPLANT NEPHROLOGY DONOR EVALUATION    Assessment and Plan:  # Prospective Kidney Transplant Donor: Patient with a couple of issues that need to be addressed prior to donation. Patient's blood pressure is elevated at this visit, kidney function appears to be possibly low with Iohexol pending, and urinalysis is bland.   -iron studies, ferritin, B12 added. Awaiting results   -Obtain results from GYN for endometrial biopsy 1/2023 and Pap smear   -Obtain derm records   -Echocardiogram   -24h ambulatory BP monitor    # Anemia:   -Heavy periods recently. Went to GYN in 1/2023 and had an endometrial biopsy, per patient was normal, and IUD was placed in 2/2023. Need to obtain records.    -Obtain ferritin, iron studies, B12, folate    # Elevated fasting BG:   - today. A1c 5.3%.      # Elevated blood pressure:   -Obtain 24h ambulatory BP monitor    # Skin lesions:   -Recently went to a dermatologist and possible had a shave biopsy? She was told it was precancerous but they wanted to take off more in an upcoming visit.    -Obtain records from [facility] in [place], Dr. Leal    # Hyperlipidemia:   -The 10-year ASCVD risk score (Isael HERNANDEZ, et al., 2019) is: 2.5%    Values used to calculate the score:      Age: 52 years      Sex: Female      Is Non- : No      Diabetic: No      Tobacco smoker: No      Systolic Blood Pressure: 138 mmHg      Is BP treated: No      HDL Cholesterol: 35 mg/dL      Total Cholesterol: 200 mg/dL   -No statin is recommended at this point    # Cardiac Risk:    - She is active, does fast walking on a track but due to father with recent CABG, elevated BP, impaired fasting BG, recommend echo     # Healthcare maintenance:   -Colonoscopy: performed 9/2019, repeat in 10 years.    -Mammogram: negative 2/27/23, repeat in 1 year   -Pap smear: per patient performed in 1/2023, was normal at [facility] in [place]. Obtain records    Discussed  the risks of donating a kidney, including the surgical risk and the possible risks of living with one kidney.    Education about expected post-donation kidney function and how chronic kidney disease (CKD) and end stage kidney disease (ESKD) might potentially impact the donor in the future, include, but not limited to:       - On average, donors will have 25-35% permanent loss of kidney function at donation.       - Baseline risk of ESKD may slightly exceed that of members of the general population with the same demographic profile.       - Donor risks must be interpreted in light of known epidemiology of both CKD or ESKD, such as that CKD generally develops in midlife (40-50 years old) and ESKD generally develops after age 60.       - Medical evaluation of young potential donors cannot predict lifetime risk of CKD or ESKD.       - Donors may be at higher risk for CKD if they sustain damage to the remaining kidney.       - Development of CKD and progression of ESKD may be more rapid with only 1 kidney.       - Some type of kidney replacement therapy, either kidney transplant or dialysis, is required when reaching ESKD.    Potential medical or surgical risks include, but not limited to:       - Death.       - Scars, pain, fatigue, and other consequences typical of any surgical procedure.       - Decreased kidney function.       - Abdominal or bowel symptoms, such as bloating and nausea, and developing bowel obstruction.       - Kidney failure (ESKD) and the need for a kidney transplant or dialysis for the donor.       - Impact of obesity, hypertension, or other donor-specific medical conditions on morbidity and mortality of the potential donor.    Patients overall evaluation will be discussed with the transplant team and a final recommendation on the patients' suitability to be a kidney transplant donor will be made at that time.    Consult:  Shireen Bautista was seen in consultation at the request of Dr. Robb  Sheron for evaluation as a potential kidney transplant donor.    Reason for Visit:  Shireen Bautista is a 52 year old female who presents for a kidney donor evaluation.  Patient would like to donate to her father.    Present Condition and Donor-Related Medical History:    Ms. Bautista is a 52yF w/ history of laproscopic cholecystectomy 2019, laparoscopy x2 for endometriosis, previous  x3, abdominoplasty in 2013, presenting to be evaluated as a potential living kidney donor to her 75y father.     She does continues to have loose stools, negative biopsies on her colonoscopy 2019.     The patient overall feels well. She denies any recent hospitalizations. She denies nausea, vomiting, fever, chills, shortness of breath, chest pain, LE edema, unintentional weight loss, nights sweats, dysuria, hematuria.              Kidney Disease Hx:       h/o Kidney Problems: No  Family h/o Genetic Kidney Disease: No       h/o Hypertension: No    Usual Blood Pressure: 120s systolic       h/o Protein in Urine: No  h/o Blood in Urine: No       h/o Kidney Stones: No  h/o Kidney Injury: No       h/o Recurrent UTI: No  h/o Genitourinary Problems: No       h/o Chronic NSAID Use: No         Other Medical Hx:       h/o Diabetes: No             h/o Gastrointestinal, Pancreas or Liver Problems: No       h/o Lung or Heart Problems: No       h/o Hematologic Problems: No  h/o Bleeding or Clotting Problems: No       h/o Cancer: No       h/o Infection Problems: No       h/o Gestational DM: No      h/o Preeclampsia: No         Skin Cancer Risk:       h/o more than 50 moles: No       h/o extensive sun exposure: No       h/o melanoma: No       Family h/o melanoma: No         Mental Health Assessment:       h/o Depression: No       h/o Psychiatric Illness: No       h/o Suicidal Attempt(s): No    COVID Status:  Vaccination Up To Date: No, due for next dose  H/o COVID Infection: Yes     Review Of Systems:   A comprehensive review of  systems was obtained and negative, except as noted in the HPI or PMH.    Past Medical History:   History was taken from the patient as noted below.  History reviewed. No pertinent past medical history.    Past Social History:   Past Surgical History:   Procedure Laterality Date     ABDOMINOPLASTY  2012      SECTION  2005, ,      COLONOSCOPY N/A 2019    Procedure: Colonoscopy, With Polypectomy And Biopsy;  Surgeon: Shahrzad Herring DO;  Location: [facility] OR     COLONOSCOPY WITH CO2 INSUFFLATION N/A 2019    Procedure: COLONOSCOPY, WITH CO2 INSUFFLATION;  Surgeon: Shahrzad Herring DO;  Location: [facility] OR     LAPAROSCOPIC CHOLECYSTECTOMY N/A 2019    Procedure: CHOLECYSTECTOMY, LAPAROSCOPIC;  Surgeon: Monty Sears MD;  Location: [facility] OR     LAPAROSCOPY DIAGNOSTIC (GENERAL)  , ?2007    endmetriosis     TUBAL LIGATION      with c/s     Personal or family history of anesthesia problems: No    Family History:   Family History   Problem Relation Age of Onset     Lipids Mother      Hypertension Mother      Coronary Artery Disease Father      Diabetes Father      Lipids Father      Chronic Kidney Disease Father      Hypertension Brother      Cardiovascular Maternal Grandmother         CHF     Unknown/Adopted Maternal Grandfather      Unknown/Adopted Paternal Grandfather      Celiac Disease Daughter      Asthma No family hx of      C.A.D. No family hx of      Thyroid Disease No family hx of           Specific Family History in First Degree Relatives:       FH of Kidney Dz: Yes  FH of Diabetes: Yes        FH of Hypertension: Yes   FH of CAD: Yes        FH of Cancer: No  FH of Kidney Cancer: No    Personal History:   Social History     Socioeconomic History     Marital status:      Spouse name: Not on file     Number of children: Not on file     Years of education: Not on file     Highest education level: Not on file   Occupational History     Not on file   Tobacco Use     Smoking  "status: Never     Smokeless tobacco: Never   Vaping Use     Vaping Use: Never used   Substance and Sexual Activity     Alcohol use: Yes     Alcohol/week: 3.0 standard drinks     Types: 3 Glasses of wine per week     Drug use: No     Sexual activity: Yes   Other Topics Concern     Parent/sibling w/ CABG, MI or angioplasty before 65F 55M? Not Asked   Social History Narrative     Not on file     Social Determinants of Health     Financial Resource Strain: Not on file   Food Insecurity: Not on file   Transportation Needs: Not on file   Physical Activity: Not on file   Stress: Not on file   Social Connections: Not on file   Intimate Partner Violence: Not on file   Housing Stability: Not on file          Specific Social History:       Health Insurance Status: Yes       Employment Status: Full time  Occupation:                        Living Arrangements: lives with their spouse and 4 children       Social Support: Yes       Presence of increased risk for disease transmission behaviors as defined by PHS guidelines: No        Allergies:  No Known Allergies    Medications:  Current Outpatient Medications   Medication Sig     cetirizine (ZYRTEC) 10 MG tablet Take 10 mg by mouth daily as needed for allergies     multivitamin w/minerals (MULTI-VITAMIN) tablet Take 1 tablet by mouth daily     No current facility-administered medications for this visit.     Medications Discontinued During This Encounter   Medication Reason     HYDROcodone-acetaminophen (NORCO) 5-325 MG tablet      ketotifen (ZADITOR/REFRESH ANTI-ITCH) 0.025 % ophthalmic solution      pimecrolimus (ELIDEL) 1 % external cream      psyllium (METAMUCIL/KONSYL) 58.6 % powder          Vitals:  Vital Signs 9/19/2019 9/19/2019 11/13/2019   Systolic 140 117 126   Diastolic 91 86 88   Pulse - - 77   Temperature - - -   Respirations 16 16 -   Weight (LB) - - 154 lb 11.2 oz   Height - - 5' 4\"   BMI (Calculated) - - 26.55   Pain Score - - -   O2 99 98 100       Exam: "   GENERAL APPEARANCE: alert and no distress  HENT: mouth without ulcers or lesions  LYMPHATICS: no cervical or supraclavicular nodes  RESP: lungs clear to auscultation - no rales, rhonchi or wheezes  CV: regular rhythm, normal rate, no rub, no murmur  EDEMA: no LE edema bilaterally  ABDOMEN: soft, nondistended, nontender, bowel sounds normal  MS: extremities normal - no gross deformities noted, no evidence of inflammation in joints, no muscle tenderness  SKIN: no rash  NEURO: normal strength and tone, sensory exam grossly normal, mentation intact and speech normal  PSYCH: mentation appears normal and affect normal/bright    Results:   Labs and imaging were ordered for this visit and reviewed by me.  Recent Results (from the past 336 hour(s))   hCG Quantitative Pregnancy    Collection Time: 03/16/23  7:36 AM   Result Value Ref Range    hCG Quantitative <1 <5 mIU/mL   Protein  random urine    Collection Time: 03/16/23  7:36 AM   Result Value Ref Range    Total Protein Urine mg/dL 7.3 1.0 - 14.0 mg/dL    Total Protein UR MG/MG CR 0.05 0.00 - 0.20 mg/mg Cr    Creatinine Urine mg/dL 151.0 mg/dL   Albumin Random Urine Quantitative with Creat Ratio    Collection Time: 03/16/23  7:36 AM   Result Value Ref Range    Creatinine Urine mg/dL 152.0 mg/dL    Albumin Urine mg/L <12.0 mg/L    Albumin Urine mg/g Cr     Routine UA with microscopic    Collection Time: 03/16/23  7:36 AM   Result Value Ref Range    Color Urine Light Yellow Colorless, Straw, Light Yellow, Yellow    Appearance Urine Clear Clear    Glucose Urine Negative Negative mg/dL    Bilirubin Urine Negative Negative    Ketones Urine Negative Negative mg/dL    Specific Gravity Urine 1.016 1.003 - 1.035    Blood Urine Negative Negative    pH Urine 5.5 5.0 - 7.0    Protein Albumin Urine Negative Negative mg/dL    Urobilinogen Urine Normal Normal, 2.0 mg/dL    Nitrite Urine Negative Negative    Leukocyte Esterase Urine Negative Negative    Mucus Urine Present (A) None  Seen /LPF    RBC Urine 1 <=2 /HPF    WBC Urine 1 <=5 /HPF    Squamous Epithelials Urine 10 (H) <=1 /HPF   CBC with platelets    Collection Time: 03/16/23  7:36 AM   Result Value Ref Range    WBC Count 6.3 4.0 - 11.0 10e3/uL    RBC Count 4.20 3.80 - 5.20 10e6/uL    Hemoglobin 11.6 (L) 11.7 - 15.7 g/dL    Hematocrit 36.8 35.0 - 47.0 %    MCV 88 78 - 100 fL    MCH 27.6 26.5 - 33.0 pg    MCHC 31.5 31.5 - 36.5 g/dL    RDW 13.5 10.0 - 15.0 %    Platelet Count 242 150 - 450 10e3/uL   Partial thromboplastin time    Collection Time: 03/16/23  7:36 AM   Result Value Ref Range    aPTT 29 22 - 38 Seconds   INR    Collection Time: 03/16/23  7:36 AM   Result Value Ref Range    INR 1.09 0.85 - 1.15   Hemoglobin A1c    Collection Time: 03/16/23  7:36 AM   Result Value Ref Range    Hemoglobin A1C 5.3 <5.7 %   Phosphorus    Collection Time: 03/16/23  7:36 AM   Result Value Ref Range    Phosphorus 2.9 2.5 - 4.5 mg/dL   Uric acid    Collection Time: 03/16/23  7:36 AM   Result Value Ref Range    Uric Acid 5.8 (H) 2.4 - 5.7 mg/dL   Lipid Profile    Collection Time: 03/16/23  7:36 AM   Result Value Ref Range    Cholesterol 200 (H) <200 mg/dL    Triglycerides 220 (H) <150 mg/dL    Direct Measure HDL 35 (L) >=50 mg/dL    LDL Cholesterol Calculated 121 (H) <=100 mg/dL    Non HDL Cholesterol 165 (H) <130 mg/dL   Comprehensive metabolic panel    Collection Time: 03/16/23  7:36 AM   Result Value Ref Range    Sodium 141 136 - 145 mmol/L    Potassium 3.6 3.4 - 5.3 mmol/L    Chloride 107 98 - 107 mmol/L    Carbon Dioxide (CO2) 25 22 - 29 mmol/L    Anion Gap 9 7 - 15 mmol/L    Urea Nitrogen 12.2 6.0 - 20.0 mg/dL    Creatinine 0.91 0.51 - 0.95 mg/dL    Calcium 9.4 8.6 - 10.0 mg/dL    Glucose 101 (H) 70 - 99 mg/dL    Alkaline Phosphatase 50 35 - 104 U/L    AST 19 10 - 35 U/L    ALT 20 10 - 35 U/L    Protein Total 6.9 6.4 - 8.3 g/dL    Albumin 4.4 3.5 - 5.2 g/dL    Bilirubin Total 0.4 <=1.2 mg/dL    GFR Estimate 76 >60 mL/min/1.73m2   Adult Type and  Screen    Collection Time: 03/16/23  7:36 AM   Result Value Ref Range    ABO/RH(D) A POS     Antibody Screen Negative Negative    SPECIMEN EXPIRATION DATE [id number]    Iron and iron binding capacity    Collection Time: 03/16/23  7:36 AM   Result Value Ref Range    Iron 66 37 - 145 ug/dL    Iron Binding Capacity 417 240 - 430 ug/dL    Iron Sat Index 16 15 - 46 %   ABO and Rh 2nd type and screen required    Collection Time: 03/16/23  8:15 AM   Result Value Ref Range    ABO/RH(D) A POS     SPECIMEN EXPIRATION DATE [id number]

## 2023-03-16 NOTE — NURSING NOTE
Chief Complaint   Patient presents with     Blood Draw     Labs drawn via piv by RN. Vitals taken.     Labs drawn from PIV placed by RN. Line flushed with saline. Vitals taken. Pt checked in for appointment(s).    Tawanna Domínguez RN

## 2023-03-16 NOTE — PROGRESS NOTES
[facility] Solid Organ Transplant  Outpatient MNT: Kidney Donor Evaluation    Current BMI: 27.6 (HT 64 in,  lbs/73 kg)    8 Year Estimated Risk of T2DM  25%- , BMI>25, high TG>150, low HDL<50, HTN     Time Spent: 15 minutes  Visit Type: Initial  Referring Physician: Sheron   Pt accompanied by: self     Nutrition Assessment  Vitamins, Supplements, Pertinent Meds: MVI, just added probiotic, vit D, vit B12, magnesium   Herbal Medicines/Supplements: none     Weight hx: overall stable     Food Security: any concerns about having enough money to buy food or access to grocery stores? No     Diet Recall  Breakfast Bowl of cereal, bagel, may stop and get BF s/w from [employer]    Lunch Works from home- grazes on leftovers, salsa/corn chips, s/w, salad      Dinner Hello Fresh meal or makes protein + veggie + starch    Snacks HS- little sweet snack   Beverages Coke (2/day- regular), water, occasional milk (a few times/week)   Alcohol 3-4 drinks/week    Dining out 2x/week      Physical Activity  Trying to get into exercise  Speed walking- just started in Feb after OB visit- 30 minutes      Labs  Recent Labs   Lab Test 03/16/23  0736 02/06/20  0918   CHOL 200* 200*   HDL 35* 38*   * 107*   TRIG 220* 274*       FBG = 101 (fasting)  A1c = 5.3  BP = 140/93, 138/89, 138/90     Prediction of Incident Diabetes Mellitus in Middle-aged Adults: The [place] Offspring Study  Chago Mello MD; James B. Meigs, MD, MPH; Aislinn Garcia, PhD; Lisbeth Martinez MD, MPH; George Baptiste MD; Alli Dent Sr,   PhD  Pt's estimated risk for T2DM (per Table 6 above)  Pt received points for the following criteria: FBG>100, BMI>25, TG>150, high BP, low HDL   Total points: 22  8-Year estimated risk of T2DM: 25%    Nutrition Diagnosis  No nutrition diagnosis identified at this time.    Nutrition Intervention  Nutrition education provided:  Reviewed overall healthy diet guidelines for pre and post kidney  donation. Discussed maintenance of a healthy weight and Na+ intake <3000 mg/day (<2000 mg/day if HTN).  Reviewed metabolic profile and lifestyle modifications to consider to improve risk factors. Would reduce sugar intake, including Coke and other carbohydrate-rich foods. Include adequate protein, veggies, etc.   Consider more strenuous activity, such as biking or other cardio.     Avoid the following post op d/t unknown effects on the organs:  - Herbal, Chinese, holistic, chiropractic, natural, alternative medicines and supplements  - Detoxes and cleanses  - Weight loss pills  - Protein powders or other products with extracts or herbs (ie green tea extract)    Patient Understanding: Pt verbalized understanding of education provided.  Expected Engagement: Good  Follow-Up Plans: PRN     Nutrition Goals  No nutrition goals identified at this time     Sintia Singh, RD, LD, CCTD

## 2023-03-16 NOTE — LETTER
3/16/2023         RE: Shireen Bautista  [address] [address]        Dear Colleague,    Thank you for referring your patient, Shireen Bautista, to the [facility]. Please see a copy of my visit note below.    Donor Iohexol test: Shireen Bautista presents today to [facility] for a Donor Iohexol test.    Progress note:  ID verified by name and .     The following information was verified with the patient:  Female Patients is there any possibility of being pregnant No  Is there a history of allergy (skin rash, swelling, ect) to:   A.  Iodine (except skin reactions to betadine): No   B. Intravenous radio-contrast agents: No   C. Seafood No     present during visit today: Not Applicable.  R.N. provided patient with educational handout regarding timed test. Yes    PIV placed in right arm and Iohexol administered over 2 minutes.  Positive blood return verified before and after injection. PIV removed.  20 gauge PIV placed in upper left arm prior to [facility] appt in 2nd floor lab  for blood draws and CT this afternoon.    Medication administered: Iohexol (Omnipaque 350mg iodine/ml concentration) 4 mls.  Start time: 0817  Stop time: 08    Evaluation nurse in transplant to draw labs at 2 and 4 hours post iohexol administration.  Patient given a slip with the times to get labs drawn and verbalized understanding of the plan.  Patient tolerated the procedure:  Yes  After the infusion patient was discharged to the next appointment.    Eunice Franco RN          Again, thank you for allowing me to participate in the care of your patient.        Sincerely,        Specialty Infusion Nurse

## 2023-03-16 NOTE — LETTER
3/16/2023         RE: Shireen Bautista  [address] [address]        Dear Colleague,    Thank you for referring your patient, Shireen Bautista, to the [facility]. Please see a copy of my visit note below.    Transplant Surgery Consult Note    Medical record number: [id number]  YOB: 1970,   Consult requested by the patient for evaluation of kidney donation candidacy.    Assessment and Recommendations: Ms. Bautista appears to be a good candidate for kidney donation at this point in the evaluation. The following issues will need to be addressed prior to formal review:    Blood pressure: will need 24 hour monitoring    Abdominal surgery: history of abdominoplasty with umbilical resiting as well as prior laparoscopic surgeries. Should not prohibit donation but surgeon should be aware of potentially altered anatomy    Iohexol ordered for today for assessment of adequate kidney function for donation. Will be reviewed when resulted  Donor labs ordered for today and reviewed to include: CBC, CMP, Mg, PO4, hemoglobin A1c, lipid panel, blood type x 2, hemoglobin A1c, UA with microscopic, urine culture, urine protein/cr, INR/PTT, Quantiferon gold, hepatitis C (antibody), hepatitis B panel, HIV, treponemia, West Nile (antibody panel), CMV (antibody panel), EBV (antibody panel), pregnancy screen (for females of childbearing age), PSA (males >50yrs), HLA tissue typing, buccal swab for eplet typing  Social work consult ordered  Dietician consult ordered  Transplant nephrology consult ordered  Transplant coordinator consult ordered  STEPH (independent living donor advovate) consult ordered  EKG ordered for today and will be reviewed when resulted. Suitable to proceed today with this testing today:  Yes   Chest x-ray ordered for today and will be reviewed when resulted. Suitable to proceed with this testing today:  Yes   CT angio of abdomen and pelvis for anatomical  assessment. Images and report to be reviewed when resulted.  Suitable to proceed with this testing today:  Yes  After review of the above, additional testing/concerns include:    none    The majority of our visit today was spent in counseling regarding the medical and surgical risks of kidney donation, the typical blaise-and post-operative experience and recovery/return to work pattern.  Surgical risks may be transient or permanent and can include but not limited to decreased kidney function or acute kidney failure and the need for dialysis or kidney transplant for the living donor in the immediate post-operative period. We also talked about post-op visits and longer term health care maintenance, as well as the implications of having one remaining kidney. This discussion included, but was not limited to rates of complications such as bleeding, infection, need for transfusion, reoperation, other organ injury, future bowel obstruction, incisional hernia, port site pain, varicocele, venous thrombosis, pulmonary embolism, renal failure, and death (3 per 10,000). The patient understands that if end stage renal failure happens that dialysis or transplant would be required. For female donor of child bearing age, the risks of preeclampsi or gestational hypertension during pregnancies after donation were discussed .  At the conclusion of the visit, all questions had been answered and Ms. Bautista's candidacy for donation will be reviewed at our Multidisciplinary Donor Selection Committee. She will stay in contact with the nurse coordinator with any concerns.      Total time: 60 minutes        Cezar Holbrook MD  ---------------------------------------------------------------------------------------------------    HPI: Shireen Bautista is a 52 year old year old female who presents for a kidney donor evaluation.  Patient would like to donate to her father. She is currently working full time as an . She is in her usual  state of health without issues. She enjoys spending time with her  and children.       Personal history of:   No    Yes  Cancer:    []      []             Comment:     Diabetes   []      []  Comment:    Thombosis   []      []  Comment:       Hepatitis   []      []  Comment:    Tuberculosis   []      []  Comment:   Back or neck pain:  []      []  Comment:      Kidney stones   []      []  Comment:                  Kidney infections  []      []  Comment:           Urinary retention  []      []            Comment:   Regular NSAID use:  []      []            Comment:      Constipation:   []      []            Comment:      Uatsdin  []      []            Comment:      Other:    []      []            Comment:         No past medical history on file.  Past Surgical History:   Procedure Laterality Date    ABDOMINOPLASTY  2012     SECTION  , ,     COLONOSCOPY N/A 2019    Procedure: Colonoscopy, With Polypectomy And Biopsy;  Surgeon: Shahrzad Herring DO;  Location: [facility] OR    COLONOSCOPY WITH CO2 INSUFFLATION N/A 2019    Procedure: COLONOSCOPY, WITH CO2 INSUFFLATION;  Surgeon: Shahrzad Herring DO;  Location: [facility] OR    LAPAROSCOPIC CHOLECYSTECTOMY N/A 2019    Procedure: CHOLECYSTECTOMY, LAPAROSCOPIC;  Surgeon: Monty Sears MD;  Location: [facility] OR    LAPAROSCOPY DIAGNOSTIC (GENERAL)  , ?    endmetriosis    TUBAL LIGATION      with c/s     Family History   Problem Relation Age of Onset    Lipids Mother     Hypertension Mother     Coronary Artery Disease Father     Diabetes Father     Lipids Father     Chronic Kidney Disease Father     Hypertension Brother     Cardiovascular Maternal Grandmother         CHF    Unknown/Adopted Maternal Grandfather     Unknown/Adopted Paternal Grandfather     Celiac Disease Daughter     Asthma No family hx of     C.A.D. No family hx of     Thyroid Disease No family hx of      Social History     Socioeconomic History    Marital  status:      Spouse name: Not on file    Number of children: Not on file    Years of education: Not on file    Highest education level: Not on file   Occupational History    Not on file   Tobacco Use    Smoking status: Never    Smokeless tobacco: Never   Vaping Use    Vaping status: Never Used   Substance and Sexual Activity    Alcohol use: Yes     Alcohol/week: 3.0 standard drinks of alcohol     Types: 3 Glasses of wine per week    Drug use: No    Sexual activity: Yes   Other Topics Concern    Parent/sibling w/ CABG, MI or angioplasty before 65F 55M? Not Asked   Social History Narrative    Not on file     Social Determinants of Health     Financial Resource Strain: Not on file   Food Insecurity: Not on file   Transportation Needs: Not on file   Physical Activity: Not on file   Stress: Not on file   Social Connections: Not on file   Intimate Partner Violence: Not on file   Housing Stability: Not on file       ROS:   CONSTITUTIONAL:  No fevers or chills  EYES: negative for icterus  ENT:  negative for hearing loss, tinnitus and sore throat  RESPIRATORY:  negative for cough, sputum, dyspnea  CARDIOVASCULAR:  negative for chest pain  GASTROINTESTINAL:  negative for nausea, vomiting, diarrhea or constipation  GENITOURINARY:  negative for incontinence, dysuria, bladder emptying problems  HEME:  No easy bruising  INTEGUMENT:  negative for rash and pruritus  NEURO:  Negative for headache, seizure disorder    Allergies:   No Known Allergies    Medications:  Prescription Medications as of 4/28/2023         Rx Number Disp Refills Start End Last Dispensed Date Next Fill Date Owning Pharmacy    cetirizine (ZYRTEC) 10 MG tablet            Sig: Take 10 mg by mouth daily as needed for allergies    Class: Historical    Route: Oral    multivitamin w/minerals (MULTI-VITAMIN) tablet            Sig: Take 1 tablet by mouth daily    Class: Historical    Route: Oral            Exam:      Body mass index is 27.64 kg/m .      Appearance: in no apparent distress.   Skin: normal, warm, dry  Head and Neck: Normal, no rashes or jaundice  Respiratory: normal respiratory excursions, no audible wheeze  Cardiovascular: RRR  Abdomen: soft, flat, nontender. Scars consistent with history. No evident hernia  Extremeties: Edema, none  Neuro: without deficit, cranial nerves intact       Diagnostics:   Recent Results (from the past 336 hour(s))   Echocardiogram Complete    Collection Time: 04/14/23  9:17 AM   Result Value Ref Range    LVEF  60-65%            Again, thank you for allowing me to participate in the care of your patient.        Sincerely,        Cezar Holbrook MD

## 2023-03-16 NOTE — LETTER
Date:March 17, 2023      Provider requested that no letter be sent. Do not send.       [facility]

## 2023-03-16 NOTE — PROGRESS NOTES
Donor Iohexol test: Shireen Bautista presents today to [facility] for a Donor Iohexol test.    Progress note:  ID verified by name and .     The following information was verified with the patient:  Female Patients is there any possibility of being pregnant No  Is there a history of allergy (skin rash, swelling, ect) to:   A.  Iodine (except skin reactions to betadine): No   B. Intravenous radio-contrast agents: No   C. Seafood No     present during visit today: Not Applicable.  R.N. provided patient with educational handout regarding timed test. Yes    PIV placed in right arm and Iohexol administered over 2 minutes.  Positive blood return verified before and after injection. PIV removed.  20 gauge PIV placed in upper left arm prior to [facility] appt in 2nd floor lab  for blood draws and CT this afternoon.    Medication administered: Iohexol (Omnipaque 350mg iodine/ml concentration) 4 mls.  Start time: 0817  Stop time: 0819    Evaluation nurse in transplant to draw labs at 2 and 4 hours post iohexol administration.  Patient given a slip with the times to get labs drawn and verbalized understanding of the plan.  Patient tolerated the procedure:  Yes  After the infusion patient was discharged to the next appointment.    Eunice Franco RN

## 2023-03-16 NOTE — PROGRESS NOTES
Living Kidney Donor Consent per OPTN Policy 14.2 for Independent Living Donor Advocate (STEPH)    Organ Type: related, kidney donor  Presenting Information:  Ms. Shireen Bautista presents to [facility] to complete a living donor evaluation since she is interested in becoming a kidney donor for his father.      Written assurance has been obtained from the potential donor that he/she:   Is willing to donate  Is free from inducement and coercion  Has been informed that the he/she may decline to donate at any time  Has been informed that transplant centers must:   A) Offer donors an opportunity to discontinue the donor consent or evaluation process in a way that is protected and confidential  B) Provide an independent living donor advocate (STEPH) to assist the potential donor during this process    The following was presented to the potential donor in a language in which the potential donor is able to engage in meaningful dialogue:   Education and instruction about all phases of the living donation process including:   Consent  Medical and psychosocial evaluation  Information about the surgical procedure  Pre and post operative care  Benefits of post operative follow up  Disclosure that the recovery hospital will take all reasonable precautions to provide confidentiality for the donor/recipient  Disclosure that it is a federal crime for any person to knowingly acquire, obtain or otherwise transfer any human organ for valuable consideration  Disclosure that the [facility] hospital must provide an independent living donor advocate (STEPH)  Disclosure that health information obtained during the evaluation is subject to the same regulations as all records and could reveal conditions that must be reported to local, state, or federal public health authorities  Disclosure that the [facility] hospital is required to report living donor follow up  information at 6 months, 1 year, and 2 years, and that the potential donor must commit to post operative follow up testing coordinated by the [facility]    Disclosure has been provided that these risks may be transient or permanent & include but are not limited to:  Potential psychosocial risks:  Problems with body image  Post-surgery depression or anxiety  Feelings of emotional distress or bereavement if recipient experiences any recurrent disease or in the event of the recipient s death  Impact of donation on the donor s lifestyle, such as limited ability to exercise in the short term post operative recovery period, no driving for the first 2 weeks post op or until the donor is no longer needing pain medications that impair the ability to drive.      Potential financial impacts:  Personal expenses of travel, housing, , lost wages related to donation might not be reimbursed. However, resources may be available to defray some donation-related expenses.   Need for life-long follow up at the donor s expense  Loss of employment or income  Negative impact on the ability to obtain future employment  Negative impact on the ability to obtain, maintain, or afford health, disability, and life insurance  Future health problems experienced by living donors following donation may not be covered by the recipient s insurance      PREPARATION FOR DONATION, RECOVERY, AND POTENTIAL SHORT-LONG-TERM OUTCOMES:  Understanding of the Living Donation Process:  We discussed the role of Independent Living Donor Advocate.  Short and long term medical and psychosocial risks to both, donor and recipient were reviewed and she is able to teach back to me these risks.  Post surgical restrictions (2 weeks no driving, 6 weeks no lifting over 10 lbs) were reviewed and she appears capable of adhering to the post surgical requirements. The need for a caregiver was discussed and she has her  to care for her post surgery .  The  risk of poor psychosocial outcome including problems with body image, post-surgery depression or anxiety, or feelings of emotional distress or bereavement if recipient experiences any recurrent disease, poor outcome or death was reviewed.  Additionally, potential financial implications, including the risk of having difficulty obtaining health care insurance, life insurance, disability insurance, or long term care insurance were reviewed, as were available donor grants to assist with donor related expenses.      IMPRESSIONS/RECOMMENDATIONS:  Ms. Shireen Bautista appears highly motivated to donate a kidney to her father via paired kidney donation.  She appears capable of understanding this information and making an informed medical decision.  As STEPH, no concerns were identified today.  She has my contact information and is aware that I am available thoughout the donation process.    Contact Information:  ARNULFO Arteaga, [facility]  Independent Living Donor Advocate  [facility]  [facility] Organ Transplant Clinic, [address]  [address] [place] [address]  [id number]  Direct:  [phone] Cell Phone  E-Mail:  [email or web address]      Time Spent: 30 minutes

## 2023-03-16 NOTE — PROGRESS NOTES
Saw Shireen in clinic on 3/16/23 for Living Kidney Donor Evaluation.     She is interested in donation for her father Jerson Barnett.    I provided a folder which included copies of the followin. Living Kidney Donor Evaluation Consent  2. Paired Exchange Consent  3. Donor Shield Pamphlet  4. Living Donor Collective Study information  5. Kidney for Life pamphlet  6. Kidney Donors are Heroes! Study synopsis  7. Most current SRTR data.      I also provided a parking pass and food voucher.      I reviewed the Living Kidney Donor Evaluation Consent, dated 2020 and Paired Exchange/ NDD consent dated 2018.  I answered any question.    Evaluation Notes:  Dr. Vang recommending 24hour BP and echo with eval  Iron studies added to labs  Need derm and GYN (pap and bx) records  Tissue typing collected and sent       
no

## 2023-03-16 NOTE — LETTER
3/16/2023       RE: Shireen Bautista  [address] [address]     Dear Colleague,    Thank you for referring your patient, Shireen Bautista, to the [facility] NEPHROLOGY [facility]INIC [place] at [facility]. Please see a copy of my visit note below.    TRANSPLANT NEPHROLOGY DONOR EVALUATION    Assessment and Plan:  # Prospective Kidney Transplant Donor: Patient with a couple of issues that need to be addressed prior to donation. Patient's blood pressure is elevated at this visit, kidney function appears to be possibly low with Iohexol pending, and urinalysis is bland.   -iron studies, ferritin, B12 added. Awaiting results   -Obtain results from GYN for endometrial biopsy 1/2023 and Pap smear   -Obtain derm records   -Echocardiogram   -24h ambulatory BP monitor    # Anemia:   -Heavy periods recently. Went to GYN in 1/2023 and had an endometrial biopsy, per patient was normal, and IUD was placed in 2/2023. Need to obtain records.    -Obtain ferritin, iron studies, B12, folate    # Elevated fasting BG:   - today. A1c 5.3%.      # Elevated blood pressure:   -Obtain 24h ambulatory BP monitor    # Skin lesions:   -Recently went to a dermatologist and possible had a shave biopsy? She was told it was precancerous but they wanted to take off more in an upcoming visit.    -Obtain records from Dermatology specialists in Dr. Mel Gloria    # Hyperlipidemia:   -The 10-year ASCVD risk score (Isael HERNANDEZ, et al., 2019) is: 2.5%    Values used to calculate the score:      Age: 52 years      Sex: Female      Is Non- : No      Diabetic: No      Tobacco smoker: No      Systolic Blood Pressure: 138 mmHg      Is BP treated: No      HDL Cholesterol: 35 mg/dL      Total Cholesterol: 200 mg/dL   -No statin is recommended at this point    # Cardiac Risk:    - She is active, does fast walking on a track but due to father with recent CABG,  elevated BP, impaired fasting BG, recommend echo     # Healthcare maintenance:   -Colonoscopy: performed 9/2019, repeat in 10 years.    -Mammogram: negative 2/27/23, repeat in 1 year   -Pap smear: per patient performed in 1/2023, was normal at [facility] in [place]. Obtain records    Discussed the risks of donating a kidney, including the surgical risk and the possible risks of living with one kidney.    Education about expected post-donation kidney function and how chronic kidney disease (CKD) and end stage kidney disease (ESKD) might potentially impact the donor in the future, include, but not limited to:       - On average, donors will have 25-35% permanent loss of kidney function at donation.       - Baseline risk of ESKD may slightly exceed that of members of the general population with the same demographic profile.       - Donor risks must be interpreted in light of known epidemiology of both CKD or ESKD, such as that CKD generally develops in midlife (40-50 years old) and ESKD generally develops after age 60.       - Medical evaluation of young potential donors cannot predict lifetime risk of CKD or ESKD.       - Donors may be at higher risk for CKD if they sustain damage to the remaining kidney.       - Development of CKD and progression of ESKD may be more rapid with only 1 kidney.       - Some type of kidney replacement therapy, either kidney transplant or dialysis, is required when reaching ESKD.    Potential medical or surgical risks include, but not limited to:       - Death.       - Scars, pain, fatigue, and other consequences typical of any surgical procedure.       - Decreased kidney function.       - Abdominal or bowel symptoms, such as bloating and nausea, and developing bowel obstruction.       - Kidney failure (ESKD) and the need for a kidney transplant or dialysis for the donor.       - Impact of obesity, hypertension, or other donor-specific medical conditions on  morbidity and mortality of the potential donor.    Patients overall evaluation will be discussed with the transplant team and a final recommendation on the patients' suitability to be a kidney transplant donor will be made at that time.    Consult:  Shireen Bautista was seen in consultation at the request of Dr. Cezar Holbrook for evaluation as a potential kidney transplant donor.    Reason for Visit:  Shireen Bautista is a 52 year old female who presents for a kidney donor evaluation.  Patient would like to donate to her father.    Present Condition and Donor-Related Medical History:    Ms. Bautista is a 52yF w/ history of laproscopic cholecystectomy 2019, laparoscopy x2 for endometriosis, previous  x3, abdominoplasty in 2013, presenting to be evaluated as a potential living kidney donor to her 75y father.     She does continues to have loose stools, negative biopsies on her colonoscopy 2019.     The patient overall feels well. She denies any recent hospitalizations. She denies nausea, vomiting, fever, chills, shortness of breath, chest pain, LE edema, unintentional weight loss, nights sweats, dysuria, hematuria.              Kidney Disease Hx:       h/o Kidney Problems: No  Family h/o Genetic Kidney Disease: No       h/o Hypertension: No    Usual Blood Pressure: 120s systolic       h/o Protein in Urine: No  h/o Blood in Urine: No       h/o Kidney Stones: No  h/o Kidney Injury: No       h/o Recurrent UTI: No  h/o Genitourinary Problems: No       h/o Chronic NSAID Use: No         Other Medical Hx:       h/o Diabetes: No             h/o Gastrointestinal, Pancreas or Liver Problems: No       h/o Lung or Heart Problems: No       h/o Hematologic Problems: No  h/o Bleeding or Clotting Problems: No       h/o Cancer: No       h/o Infection Problems: No       h/o Gestational DM: No      h/o Preeclampsia: No         Skin Cancer Risk:       h/o more than 50 moles: No       h/o extensive sun exposure: No        h/o melanoma: No       Family h/o melanoma: No         Mental Health Assessment:       h/o Depression: No       h/o Psychiatric Illness: No       h/o Suicidal Attempt(s): No    COVID Status:  Vaccination Up To Date: No, due for next dose  H/o COVID Infection: Yes     Review Of Systems:   A comprehensive review of systems was obtained and negative, except as noted in the HPI or PMH.    Past Medical History:   History was taken from the patient as noted below.  History reviewed. No pertinent past medical history.    Past Social History:   Past Surgical History:   Procedure Laterality Date     ABDOMINOPLASTY  2012      SECTION  , ,      COLONOSCOPY N/A 2019    Procedure: Colonoscopy, With Polypectomy And Biopsy;  Surgeon: Shahrzad Herring DO;  Location: [facility] OR     COLONOSCOPY WITH CO2 INSUFFLATION N/A 2019    Procedure: COLONOSCOPY, WITH CO2 INSUFFLATION;  Surgeon: Shahrzad Herring DO;  Location: [facility] OR     LAPAROSCOPIC CHOLECYSTECTOMY N/A 2019    Procedure: CHOLECYSTECTOMY, LAPAROSCOPIC;  Surgeon: Monty Sears MD;  Location: [facility] OR     LAPAROSCOPY DIAGNOSTIC (GENERAL)  , ?    endmetriosis     TUBAL LIGATION      with c/s     Personal or family history of anesthesia problems: No    Family History:   Family History   Problem Relation Age of Onset     Lipids Mother      Hypertension Mother      Coronary Artery Disease Father      Diabetes Father      Lipids Father      Chronic Kidney Disease Father      Hypertension Brother      Cardiovascular Maternal Grandmother         CHF     Unknown/Adopted Maternal Grandfather      Unknown/Adopted Paternal Grandfather      Celiac Disease Daughter      Asthma No family hx of      C.A.D. No family hx of      Thyroid Disease No family hx of           Specific Family History in First Degree Relatives:       FH of Kidney Dz: Yes  FH of Diabetes: Yes        FH of Hypertension: Yes   FH of CAD: Yes        FH of Cancer: No  FH of  Kidney Cancer: No    Personal History:   Social History     Socioeconomic History     Marital status:      Spouse name: Not on file     Number of children: Not on file     Years of education: Not on file     Highest education level: Not on file   Occupational History     Not on file   Tobacco Use     Smoking status: Never     Smokeless tobacco: Never   Vaping Use     Vaping Use: Never used   Substance and Sexual Activity     Alcohol use: Yes     Alcohol/week: 3.0 standard drinks     Types: 3 Glasses of wine per week     Drug use: No     Sexual activity: Yes   Other Topics Concern     Parent/sibling w/ CABG, MI or angioplasty before 65F 55M? Not Asked   Social History Narrative     Not on file     Social Determinants of Health     Financial Resource Strain: Not on file   Food Insecurity: Not on file   Transportation Needs: Not on file   Physical Activity: Not on file   Stress: Not on file   Social Connections: Not on file   Intimate Partner Violence: Not on file   Housing Stability: Not on file          Specific Social History:       Health Insurance Status: Yes       Employment Status: Full time  Occupation:                        Living Arrangements: lives with their spouse and 4 children       Social Support: Yes       Presence of increased risk for disease transmission behaviors as defined by PHS guidelines: No        Allergies:  No Known Allergies    Medications:  Current Outpatient Medications   Medication Sig     cetirizine (ZYRTEC) 10 MG tablet Take 10 mg by mouth daily as needed for allergies     multivitamin w/minerals (MULTI-VITAMIN) tablet Take 1 tablet by mouth daily     No current facility-administered medications for this visit.     Medications Discontinued During This Encounter   Medication Reason     HYDROcodone-acetaminophen (NORCO) 5-325 MG tablet      ketotifen (ZADITOR/REFRESH ANTI-ITCH) 0.025 % ophthalmic solution      pimecrolimus (ELIDEL) 1 % external cream      psyllium  "(METAMUCIL/KONSYL) 58.6 % powder          Vitals:  Vital Signs 9/19/2019 9/19/2019 11/13/2019   Systolic 140 117 126   Diastolic 91 86 88   Pulse - - 77   Temperature - - -   Respirations 16 16 -   Weight (LB) - - 154 lb 11.2 oz   Height - - 5' 4\"   BMI (Calculated) - - 26.55   Pain Score - - -   O2 99 98 100       Exam:   GENERAL APPEARANCE: alert and no distress  HENT: mouth without ulcers or lesions  LYMPHATICS: no cervical or supraclavicular nodes  RESP: lungs clear to auscultation - no rales, rhonchi or wheezes  CV: regular rhythm, normal rate, no rub, no murmur  EDEMA: no LE edema bilaterally  ABDOMEN: soft, nondistended, nontender, bowel sounds normal  MS: extremities normal - no gross deformities noted, no evidence of inflammation in joints, no muscle tenderness  SKIN: no rash  NEURO: normal strength and tone, sensory exam grossly normal, mentation intact and speech normal  PSYCH: mentation appears normal and affect normal/bright    Results:   Labs and imaging were ordered for this visit and reviewed by me.  Recent Results (from the past 336 hour(s))   hCG Quantitative Pregnancy    Collection Time: 03/16/23  7:36 AM   Result Value Ref Range    hCG Quantitative <1 <5 mIU/mL   Protein  random urine    Collection Time: 03/16/23  7:36 AM   Result Value Ref Range    Total Protein Urine mg/dL 7.3 1.0 - 14.0 mg/dL    Total Protein UR MG/MG CR 0.05 0.00 - 0.20 mg/mg Cr    Creatinine Urine mg/dL 151.0 mg/dL   Albumin Random Urine Quantitative with Creat Ratio    Collection Time: 03/16/23  7:36 AM   Result Value Ref Range    Creatinine Urine mg/dL 152.0 mg/dL    Albumin Urine mg/L <12.0 mg/L    Albumin Urine mg/g Cr     Routine UA with microscopic    Collection Time: 03/16/23  7:36 AM   Result Value Ref Range    Color Urine Light Yellow Colorless, Straw, Light Yellow, Yellow    Appearance Urine Clear Clear    Glucose Urine Negative Negative mg/dL    Bilirubin Urine Negative Negative    Ketones Urine Negative Negative " mg/dL    Specific Gravity Urine 1.016 1.003 - 1.035    Blood Urine Negative Negative    pH Urine 5.5 5.0 - 7.0    Protein Albumin Urine Negative Negative mg/dL    Urobilinogen Urine Normal Normal, 2.0 mg/dL    Nitrite Urine Negative Negative    Leukocyte Esterase Urine Negative Negative    Mucus Urine Present (A) None Seen /LPF    RBC Urine 1 <=2 /HPF    WBC Urine 1 <=5 /HPF    Squamous Epithelials Urine 10 (H) <=1 /HPF   CBC with platelets    Collection Time: 03/16/23  7:36 AM   Result Value Ref Range    WBC Count 6.3 4.0 - 11.0 10e3/uL    RBC Count 4.20 3.80 - 5.20 10e6/uL    Hemoglobin 11.6 (L) 11.7 - 15.7 g/dL    Hematocrit 36.8 35.0 - 47.0 %    MCV 88 78 - 100 fL    MCH 27.6 26.5 - 33.0 pg    MCHC 31.5 31.5 - 36.5 g/dL    RDW 13.5 10.0 - 15.0 %    Platelet Count 242 150 - 450 10e3/uL   Partial thromboplastin time    Collection Time: 03/16/23  7:36 AM   Result Value Ref Range    aPTT 29 22 - 38 Seconds   INR    Collection Time: 03/16/23  7:36 AM   Result Value Ref Range    INR 1.09 0.85 - 1.15   Hemoglobin A1c    Collection Time: 03/16/23  7:36 AM   Result Value Ref Range    Hemoglobin A1C 5.3 <5.7 %   Phosphorus    Collection Time: 03/16/23  7:36 AM   Result Value Ref Range    Phosphorus 2.9 2.5 - 4.5 mg/dL   Uric acid    Collection Time: 03/16/23  7:36 AM   Result Value Ref Range    Uric Acid 5.8 (H) 2.4 - 5.7 mg/dL   Lipid Profile    Collection Time: 03/16/23  7:36 AM   Result Value Ref Range    Cholesterol 200 (H) <200 mg/dL    Triglycerides 220 (H) <150 mg/dL    Direct Measure HDL 35 (L) >=50 mg/dL    LDL Cholesterol Calculated 121 (H) <=100 mg/dL    Non HDL Cholesterol 165 (H) <130 mg/dL   Comprehensive metabolic panel    Collection Time: 03/16/23  7:36 AM   Result Value Ref Range    Sodium 141 136 - 145 mmol/L    Potassium 3.6 3.4 - 5.3 mmol/L    Chloride 107 98 - 107 mmol/L    Carbon Dioxide (CO2) 25 22 - 29 mmol/L    Anion Gap 9 7 - 15 mmol/L    Urea Nitrogen 12.2 6.0 - 20.0 mg/dL    Creatinine 0.91  0.51 - 0.95 mg/dL    Calcium 9.4 8.6 - 10.0 mg/dL    Glucose 101 (H) 70 - 99 mg/dL    Alkaline Phosphatase 50 35 - 104 U/L    AST 19 10 - 35 U/L    ALT 20 10 - 35 U/L    Protein Total 6.9 6.4 - 8.3 g/dL    Albumin 4.4 3.5 - 5.2 g/dL    Bilirubin Total 0.4 <=1.2 mg/dL    GFR Estimate 76 >60 mL/min/1.73m2   Adult Type and Screen    Collection Time: 03/16/23  7:36 AM   Result Value Ref Range    ABO/RH(D) A POS     Antibody Screen Negative Negative    SPECIMEN EXPIRATION DATE [id number]    Iron and iron binding capacity    Collection Time: 03/16/23  7:36 AM   Result Value Ref Range    Iron 66 37 - 145 ug/dL    Iron Binding Capacity 417 240 - 430 ug/dL    Iron Sat Index 16 15 - 46 %   ABO and Rh 2nd type and screen required    Collection Time: 03/16/23  8:15 AM   Result Value Ref Range    ABO/RH(D) A POS     SPECIMEN EXPIRATION DATE [id number]      Again, thank you for allowing me to participate in the care of your patient.      Sincerely,    Harsha Vang MD

## 2023-03-17 LAB
EBV VCA IGM SER IA-ACNC: <10 U/ML
EBV VCA IGM SER IA-ACNC: NORMAL
GAMMA INTERFERON BACKGROUND BLD IA-ACNC: 0.03 IU/ML
M TB IFN-G BLD-IMP: NEGATIVE
M TB IFN-G CD4+ BCKGRND COR BLD-ACNC: 9.97 IU/ML
MITOGEN IGNF BCKGRD COR BLD-ACNC: 0 IU/ML
MITOGEN IGNF BCKGRD COR BLD-ACNC: 0.01 IU/ML
QUANTIFERON MITOGEN: 10 IU/ML
QUANTIFERON NIL TUBE: 0.03 IU/ML
QUANTIFERON TB1 TUBE: 0.03 IU/ML
QUANTIFERON TB2 TUBE: 0.04

## 2023-03-18 LAB — STRONGYLOIDES IGG SER IA-ACNC: 0.2 IV

## 2023-03-19 LAB
ATRIAL RATE - MUSE: 72 BPM
DIASTOLIC BLOOD PRESSURE - MUSE: NORMAL MMHG
INTERPRETATION ECG - MUSE: NORMAL
P AXIS - MUSE: 17 DEGREES
PR INTERVAL - MUSE: 152 MS
QRS DURATION - MUSE: 76 MS
QT - MUSE: 414 MS
QTC - MUSE: 453 MS
R AXIS - MUSE: 26 DEGREES
SYSTOLIC BLOOD PRESSURE - MUSE: NORMAL MMHG
T AXIS - MUSE: 37 DEGREES
VENTRICULAR RATE- MUSE: 72 BPM
WNV IGG SER IA-ACNC: 0.22 IV
WNV IGM SER IA-ACNC: 0.02 IV

## 2023-03-20 LAB — TRYPANOSOMA CRUZI: NORMAL

## 2023-03-20 NOTE — PROGRESS NOTES
"Psychosocial Evaluation  Living Organ Donation per OPTN Policy 14.1.A  Organ Type: living kidney donor   Presenting Information: Shireen presents to the [facility], Solid Organ Transplant Clinic to complete a psychosocial evaluation since she is interested in becoming a living kidney donor for her father.    PERSONAL BACKGROUND:  Current Living Situation: Shireen lives in [place], [place] with her  and four teenage children.     Education/Employment/Financial Situation: Shireen has a professional degree and works full time as a . 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 Reviewed NKR donor shield.     Health Insurance Status: UMR through her  who works for [employer]     Family History: Shireen is  and has 4 kids (17, 14, 14, and 13). She has 2 brothers and 1 sister. Her parents are living. She grew up in [place].     General Health: Doctors \"sporadically.\" Has OBGYN that she sees for care. Does not have a health care directive.     Mental Health: The donor denies any past or present treatment for mental health issues, such as anxiety, depression, bipolar disorder, or disorders of thought such as schizophrenia or schizoaffective disorder.  There is no history of personality disorder or eating disorders.  The donor denies any need to see a counselor or therapist at this time.  The donor denies any past suicidal ideation, plans, or past attempts.  The donor denies any use of psychotropic medications at this time or in the past.  The donor denies any past history of hospitalization for psychiatric illnesses.  The donor denies any past history of ADHD or ADD.  The donor denies any history of educational issues or need for special educational services in their past history.    Alcohol and Drug Use/Abuse/Dependency: She reports that she consumes approximately 3 servings of alcohol per week ( a serving is defined here " as one, 12 oz beer, or one 4 oz glass of wine, or one 1 /2 oz of hard liquor).  The donor denies any past history of abuse or dependency on alcohol or illicit drugs. The donor denies any current use of street drugs, including marijuana, vaping, edible marijuana, or other mood altering substances.  The donor denies any past history of negative consequences of use of alcohol or drugs such as a DUI, relationship problems, problems with fulfilling parenting or other care giving responsibilities, or problems with work performance.       Cigarette Use: Denies    Legal: Denies    Coping with major surgery/associated stress: reading, spends time with cats     Support System: Spouse and mother would be her support post operatively.     DONOR SPECIFIC INFORMATION:  Relationship to Recipient: daughter of recip     Decision Process/Motivation to Donate: Shireen states that she had not considered being a donor until she came with her dad to his kidney txp appts and realized how big of a deal dialysis was. Her dad is active and wants him to be able to live a lot longer. She did express that she wants to be healthier and make herself a priority.     High risk behaviors as defined by [place] Public Health Services (PHS) that have potential to increase risk of disease transmission were reviewed and no risks identified.     PREPARATION FOR DONATION, RECOVERY, AND POTENTIAL SHORT-LONG-TERM OUTCOMES:  Understanding of the Living Donation Process:  We discussed the role of living donor .  Short and long term medical and psychosocial risks to both, donor and recipient were reviewed and she expressed understanding.  Post surgical restrictions (2 weeks no driving, 6 weeks no lifting over 10 lbs) were reviewed and she appears capable of adhering to the post surgical requirements. The need for a caregiver was discussed and she has a supportive family.  The risk of poor psychosocial outcome including problems with body image,  post-surgery depression or anxiety, or feelings of emotional distress or bereavement if recipient experiences any recurrent disease, poor outcome or death was reviewed.  Additionally, potential financial implications, including the risk of having difficulty obtaining health care insurance, life insurance, disability insurance, or long term care insurance were reviewed, as were available donor grants to assist with donor related expenses.      We also discussed some unique issues that arise with paired kidney donation, which include the uncertainty of the timing and the importance of having a employment situation and support system that is able to provide sustained support and flexibility.    She appears capable of understanding this information and making an informed medical decision.    Impressions/Recommendations:   Shireen  is highly motivated to donate a kidney to her father. Her decision to donate is free of inducement, coercion, or other undue pressure. Her housing, finances and employment are stable.  No current/active mental health or chemical abuse issues were identified.  The need for a caregiver was reviewed and She is able to identify a plan to meet her post operative care needs. She appears capable of making an informed medical decision.  No psychosocial contraindications to living organ donation were identified and  I support Shireen s desire to donate a kidney to her father.         Contact Information:   BLANCA Jacobs, [facility]   Living Donor   [facility], [facility], [facility]  Direct: [phone]  E-Mail: [email or web address]      Time Spent: 40 minutes

## 2023-03-21 ENCOUNTER — DOCUMENTATION ONLY (OUTPATIENT)
Dept: TRANSPLANT | Facility: CLINIC | Age: 53
End: 2023-03-21
Payer: COMMERCIAL

## 2023-03-21 LAB
BSA: 1.84 M2
IOHEXOL CL UR+SERPL-VRATE: 104 ML/MIN
IOHEXOL CL UR+SERPL-VRATE: 3.47 MG/DL
IOHEXOL CL UR+SERPL-VRATE: 7.07 MG/DL
IOHEXOL CL UR+SERPL-VRATE: 98 /1.73 M2

## 2023-03-21 NOTE — PROGRESS NOTES
Image Review Meeting    ATTENDEES: Dr Hilton, Osiris Martinez, Joanna Segura,     IMAGES REVIEWED: Ct Abdomen    DECISION: Left or choice     INCIDENTALS: Yes:   3. Bilateral ovarian cystic structures. Ultrasound could be performed for further evaluation. Follow up with Gyn.

## 2023-03-22 ENCOUNTER — COMMITTEE REVIEW (OUTPATIENT)
Dept: TRANSPLANT | Facility: CLINIC | Age: 53
End: 2023-03-22
Payer: COMMERCIAL

## 2023-03-22 DIAGNOSIS — Z00.5 TRANSPLANT DONOR EVALUATION: Primary | ICD-10-CM

## 2023-03-22 LAB
A*: NORMAL
A*LOCUS SEROLOGIC EQUIVALENT: 2
A*LOCUS: NORMAL
A*SEROLOGIC EQUIVALENT: 11
ABTEST METHOD: NORMAL
B*LOCUS SEROLOGIC EQUIVALENT: 44
B*LOCUS: NORMAL
BW-1: NORMAL
C*LOCUS SEROLOGIC EQUIVALENT: 16
C*LOCUS: NORMAL
DPA1*: NORMAL
DPB1*: NORMAL
DPB1*LOCUS NMDP: NORMAL
DPB1*LOCUS: NORMAL
DPB1*NMDP: NORMAL
DQA1*LOCUS: NORMAL
DQB1*LOCUS SEROLOGIC EQUIVALENT: 2
DQB1*LOCUS: NORMAL
DRB1*LOCUS SEROLOGIC EQUIVALENT: 7
DRB1*LOCUS: NORMAL
DRB4*LOCUS SEROLOGIC EQUIVALENT: 53
DRB4*LOCUS: NORMAL
DRSSO TEST METHOD: NORMAL
Lab: NORMAL

## 2023-03-22 NOTE — COMMITTEE REVIEW
"Living Donor Committee Review Note Evaluation Date: 3/16/2023  Committee Review Date: 3/20/2023    Donor being evaluated for: Kidney    Transplant Phase: Evaluation  Transplant Status: Active    Transplant Coordinator: Osiris Martinez  Transplant Surgeon:       Committee Review Members:  Immunology Marcy Jefferson   Independent Living Donor Advocate Jyoti Morrell, [facility], Shireen Apple, [facility]   Nephrology Harsha Vang MD, Emma Akers MD, Eugenio Houston MD   Nutrition Sintia Singh,    Pharmacist Chase Ohara, [facility]    - Clinical Marilou Ramos   Transplant Gertrude Jayda Bailey, RN, Lelia Gomez, APRN CNP, Osiris Martinez, CODI, Janice Burks, NP, Joanna Segura, CODI, Sumi Espana RN   Transplant Surgery Osmani Hilton MD       Transplant Eligibility:     Committee Review Decision: Needs Re-presentation    Relative Contraindications:     Absolute Contraindications:     Committee Chair Osmani Hilton MD verbally attested to the committee's decision.    Committee Discussion Details:   All evaluation results and notes reviewed. Shireen will need further workup for:  1. 24 hour blood pressure  2. Echo  3. Start iron 325mg otc and recheck CBC in 2 months  4. Derm records and Gyn records for endometrial biopsy results  5. Return to Gyn for further evaluation of \"bilateral ovarian cystic structures\" noted on CT scan.     Once these are complete we will plan to represent Shireen at committee.     Writer called Shireen to discuss outcome of committee review and she is in agreement with plan.     "

## 2023-04-14 ENCOUNTER — HOSPITAL ENCOUNTER (OUTPATIENT)
Dept: CARDIOLOGY | Facility: CLINIC | Age: 53
Discharge: HOME | End: 2023-04-14
Attending: INTERNAL MEDICINE | Admitting: INTERNAL MEDICINE

## 2023-04-14 DIAGNOSIS — Z00.5 TRANSPLANT DONOR EVALUATION: ICD-10-CM

## 2023-04-14 LAB — LVEF ECHO: NORMAL

## 2023-04-14 PROCEDURE — 93306 TTE W/DOPPLER COMPLETE: CPT | Mod: 26 | Performed by: INTERNAL MEDICINE

## 2023-04-14 PROCEDURE — 93306 TTE W/DOPPLER COMPLETE: CPT

## 2023-04-15 ENCOUNTER — HEALTH MAINTENANCE LETTER (OUTPATIENT)
Age: 53
End: 2023-04-15

## 2023-04-25 ENCOUNTER — TELEPHONE (OUTPATIENT)
Dept: TRANSPLANT | Facility: CLINIC | Age: 53
End: 2023-04-25
Payer: COMMERCIAL

## 2023-04-25 NOTE — TELEPHONE ENCOUNTER
Writer returning call to Shireen. We discussed 24 hour blood pressure results, and echo results. Let Shireen know we would discuss at committee review tomorrow and I would call her after with recommendations. Shireen verbalized understanding and is in agreement with plan.

## 2023-04-26 ENCOUNTER — COMMITTEE REVIEW (OUTPATIENT)
Dept: TRANSPLANT | Facility: CLINIC | Age: 53
End: 2023-04-26
Payer: COMMERCIAL

## 2023-04-26 NOTE — COMMITTEE REVIEW
Living Donor Committee Review Note Evaluation Date: 3/16/2023  Committee Review Date: 4/26/2023    Donor being evaluated for: Kidney    Transplant Phase: Evaluation  Transplant Status: Active    Transplant Coordinator: Osiris Martinez  Transplant Surgeon:       Committee Review Members:  Independent Living Donor Advocate Jyoti Morrell, [facility]   Nephrology Harsha Vang MD, Eugenio Houston MD   Nutrition Sintia Singh,    Pharmacist Chase Ohara, [facility], Colette Yanes, [facility]    - Clinical Marilou Ramos   Transplant Gertrude Jayda Bailey, RN, Osiris Martinez, RN, Janice Burks, NP, Mallorie Millan, RN, Katerina Munoz LPN, Joanna Segura, CODI, Sumi Espana, CODI, April Busby, RN   Transplant Surgery Brenda Fung MD       Transplant Eligibility:     Committee Review Decision: Needs Re-presentation    Relative Contraindications:     Absolute Contraindications:     Committee Chair Brenda Fung MD verbally attested to the committee's decision.    Committee Discussion Details:  Reviewed 24 hour blood pressure. Shireen needs to see her PCP for a blood pressure medication. Once she is on the medication for 2-3 months we can re-check 24 hour BP.     Writer called Shireen to discuss outcome of committee review and she verbalized understanding and is in agreement with plan.

## 2023-04-28 NOTE — PROGRESS NOTES
Transplant Surgery Consult Note    Medical record number: [id number]  YOB: 1970,   Consult requested by the patient for evaluation of kidney donation candidacy.    Assessment and Recommendations: Ms. Bautista appears to be a good candidate for kidney donation at this point in the evaluation. The following issues will need to be addressed prior to formal review:    Blood pressure: will need 24 hour monitoring    Abdominal surgery: history of abdominoplasty with umbilical resiting as well as prior laparoscopic surgeries. Should not prohibit donation but surgeon should be aware of potentially altered anatomy    Iohexol ordered for today for assessment of adequate kidney function for donation. Will be reviewed when resulted  Donor labs ordered for today and reviewed to include: CBC, CMP, Mg, PO4, hemoglobin A1c, lipid panel, blood type x 2, hemoglobin A1c, UA with microscopic, urine culture, urine protein/cr, INR/PTT, Quantiferon gold, hepatitis C (antibody), hepatitis B panel, HIV, treponemia, West Nile (antibody panel), CMV (antibody panel), EBV (antibody panel), pregnancy screen (for females of childbearing age), PSA (males >50yrs), HLA tissue typing, buccal swab for eplet typing  Social work consult ordered  Dietician consult ordered  Transplant nephrology consult ordered  Transplant coordinator consult ordered  STEPH (independent living donor advovate) consult ordered  EKG ordered for today and will be reviewed when resulted. Suitable to proceed today with this testing today:  Yes   Chest x-ray ordered for today and will be reviewed when resulted. Suitable to proceed with this testing today:  Yes   CT angio of abdomen and pelvis for anatomical assessment. Images and report to be reviewed when resulted.  Suitable to proceed with this testing today:  Yes  After review of the above, additional testing/concerns include:    none    The majority of our visit today was spent in counseling regarding the medical  and surgical risks of kidney donation, the typical blaise-and post-operative experience and recovery/return to work pattern.  Surgical risks may be transient or permanent and can include but not limited to decreased kidney function or acute kidney failure and the need for dialysis or kidney transplant for the living donor in the immediate post-operative period. We also talked about post-op visits and longer term health care maintenance, as well as the implications of having one remaining kidney. This discussion included, but was not limited to rates of complications such as bleeding, infection, need for transfusion, reoperation, other organ injury, future bowel obstruction, incisional hernia, port site pain, varicocele, venous thrombosis, pulmonary embolism, renal failure, and death (3 per 10,000). The patient understands that if end stage renal failure happens that dialysis or transplant would be required. For female donor of child bearing age, the risks of preeclampsi or gestational hypertension during pregnancies after donation were discussed .  At the conclusion of the visit, all questions had been answered and Ms. Bautista's candidacy for donation will be reviewed at our Multidisciplinary Donor Selection Committee. She will stay in contact with the nurse coordinator with any concerns.      Total time: 60 minutes        Cezar Holbrook MD  ---------------------------------------------------------------------------------------------------    HPI: Shireen Bautista is a 52 year old year old female who presents for a kidney donor evaluation.  Patient would like to donate to her father. She is currently working full time as an . She is in her usual state of health without issues. She enjoys spending time with her  and children.       Personal history of:   No    Yes  Cancer:    []      []             Comment:     Diabetes   []      []  Comment:    [place]   []      []  Comment:       Hepatitis   []       []  Comment:    Tuberculosis   []      []  Comment:   Back or neck pain:  []      []  Comment:      Kidney stones   []      []  Comment:                  Kidney infections  []      []  Comment:           Urinary retention  []      []            Comment:   Regular NSAID use:  []      []            Comment:      Constipation:   []      []            Comment:      Mormon  []      []            Comment:      Other:    []      []            Comment:         No past medical history on file.  Past Surgical History:   Procedure Laterality Date     ABDOMINOPLASTY  2012      SECTION  , ,      COLONOSCOPY N/A 2019    Procedure: Colonoscopy, With Polypectomy And Biopsy;  Surgeon: Shahrzad Herring DO;  Location: [facility]     COLONOSCOPY WITH CO2 INSUFFLATION N/A 2019    Procedure: COLONOSCOPY, WITH CO2 INSUFFLATION;  Surgeon: Shahrzad Herring DO;  Location: [facility] OR     LAPAROSCOPIC CHOLECYSTECTOMY N/A 2019    Procedure: CHOLECYSTECTOMY, LAPAROSCOPIC;  Surgeon: Monty Sears MD;  Location: [facility] OR     LAPAROSCOPY DIAGNOSTIC (GENERAL)  , ?    endmetriosis     TUBAL LIGATION      with c/s     Family History   Problem Relation Age of Onset     Lipids Mother      Hypertension Mother      Coronary Artery Disease Father      Diabetes Father      Lipids Father      Chronic Kidney Disease Father      Hypertension Brother      Cardiovascular Maternal Grandmother         CHF     Unknown/Adopted Maternal Grandfather      Unknown/Adopted Paternal Grandfather      Celiac Disease Daughter      Asthma No family hx of      C.A.D. No family hx of      Thyroid Disease No family hx of      Social History     Socioeconomic History     Marital status:      Spouse name: Not on file     Number of children: Not on file     Years of education: Not on file     Highest education level: Not on file   Occupational History     Not on file   Tobacco Use     Smoking status: Never      Smokeless tobacco: Never   Vaping Use     Vaping status: Never Used   Substance and Sexual Activity     Alcohol use: Yes     Alcohol/week: 3.0 standard drinks of alcohol     Types: 3 Glasses of wine per week     Drug use: No     Sexual activity: Yes   Other Topics Concern     Parent/sibling w/ CABG, MI or angioplasty before 65F 55M? Not Asked   Social History Narrative     Not on file     Social Determinants of Health     Financial Resource Strain: Not on file   Food Insecurity: Not on file   Transportation Needs: Not on file   Physical Activity: Not on file   Stress: Not on file   Social Connections: Not on file   Intimate Partner Violence: Not on file   Housing Stability: Not on file       ROS:   CONSTITUTIONAL:  No fevers or chills  EYES: negative for icterus  ENT:  negative for hearing loss, tinnitus and sore throat  RESPIRATORY:  negative for cough, sputum, dyspnea  CARDIOVASCULAR:  negative for chest pain  GASTROINTESTINAL:  negative for nausea, vomiting, diarrhea or constipation  GENITOURINARY:  negative for incontinence, dysuria, bladder emptying problems  HEME:  No easy bruising  INTEGUMENT:  negative for rash and pruritus  NEURO:  Negative for headache, seizure disorder    Allergies:   No Known Allergies    Medications:  Prescription Medications as of 4/28/2023       Rx Number Disp Refills Start End Last Dispensed Date Next Fill Date Owning Pharmacy    cetirizine (ZYRTEC) 10 MG tablet            Sig: Take 10 mg by mouth daily as needed for allergies    Class: Historical    Route: Oral    multivitamin w/minerals (MULTI-VITAMIN) tablet            Sig: Take 1 tablet by mouth daily    Class: Historical    Route: Oral          Exam:      Body mass index is 27.64 kg/m .     Appearance: in no apparent distress.   Skin: normal, warm, dry  Head and Neck: Normal, no rashes or jaundice  Respiratory: normal respiratory excursions, no audible wheeze  Cardiovascular: RRR  Abdomen: soft, flat, nontender. Scars consistent  with history. No evident hernia  Extremeties: Edema, none  Neuro: without deficit, cranial nerves intact       Diagnostics:   Recent Results (from the past 336 hour(s))   Echocardiogram Complete    Collection Time: 04/14/23  9:17 AM   Result Value Ref Range    LVEF  60-65%

## 2023-05-01 ENCOUNTER — OFFICE VISIT (OUTPATIENT)
Dept: FAMILY MEDICINE | Facility: CLINIC | Age: 53
End: 2023-05-01
Payer: COMMERCIAL

## 2023-05-01 VITALS
WEIGHT: 153.7 LBS | BODY MASS INDEX: 25.61 KG/M2 | OXYGEN SATURATION: 97 % | HEART RATE: 71 BPM | DIASTOLIC BLOOD PRESSURE: 87 MMHG | HEIGHT: 65 IN | SYSTOLIC BLOOD PRESSURE: 127 MMHG | RESPIRATION RATE: 18 BRPM | TEMPERATURE: 97.3 F

## 2023-05-01 DIAGNOSIS — I10 BENIGN ESSENTIAL HYPERTENSION: ICD-10-CM

## 2023-05-01 DIAGNOSIS — Z00.00 ROUTINE GENERAL MEDICAL EXAMINATION AT A HEALTH CARE FACILITY: Primary | ICD-10-CM

## 2023-05-01 DIAGNOSIS — Z13.220 SCREENING FOR HYPERLIPIDEMIA: ICD-10-CM

## 2023-05-01 DIAGNOSIS — Z00.5 TRANSPLANT DONOR EVALUATION: ICD-10-CM

## 2023-05-01 LAB
BASOPHILS # BLD AUTO: 0 10E3/UL (ref 0–0.2)
BASOPHILS NFR BLD AUTO: 0 %
CHOLEST SERPL-MCNC: 181 MG/DL
EOSINOPHIL # BLD AUTO: 0.2 10E3/UL (ref 0–0.7)
EOSINOPHIL NFR BLD AUTO: 3 %
ERYTHROCYTE [DISTWIDTH] IN BLOOD BY AUTOMATED COUNT: 13.5 % (ref 10–15)
FASTING STATUS PATIENT QL REPORTED: YES
HCT VFR BLD AUTO: 42.2 % (ref 35–47)
HDLC SERPL-MCNC: 33 MG/DL
HGB BLD-MCNC: 13.6 G/DL (ref 11.7–15.7)
IMM GRANULOCYTES # BLD: 0 10E3/UL
IMM GRANULOCYTES NFR BLD: 0 %
LDLC SERPL CALC-MCNC: 122 MG/DL
LYMPHOCYTES # BLD AUTO: 1.2 10E3/UL (ref 0.8–5.3)
LYMPHOCYTES NFR BLD AUTO: 21 %
MCH RBC QN AUTO: 29.3 PG (ref 26.5–33)
MCHC RBC AUTO-ENTMCNC: 32.2 G/DL (ref 31.5–36.5)
MCV RBC AUTO: 91 FL (ref 78–100)
MONOCYTES # BLD AUTO: 0.3 10E3/UL (ref 0–1.3)
MONOCYTES NFR BLD AUTO: 6 %
NEUTROPHILS # BLD AUTO: 3.9 10E3/UL (ref 1.6–8.3)
NEUTROPHILS NFR BLD AUTO: 70 %
NONHDLC SERPL-MCNC: 148 MG/DL
PLATELET # BLD AUTO: 240 10E3/UL (ref 150–450)
RBC # BLD AUTO: 4.64 10E6/UL (ref 3.8–5.2)
TRIGL SERPL-MCNC: 128 MG/DL
WBC # BLD AUTO: 5.6 10E3/UL (ref 4–11)

## 2023-05-01 PROCEDURE — 36415 COLL VENOUS BLD VENIPUNCTURE: CPT | Performed by: INTERNAL MEDICINE

## 2023-05-01 PROCEDURE — 80061 LIPID PANEL: CPT | Performed by: INTERNAL MEDICINE

## 2023-05-01 PROCEDURE — 85025 COMPLETE CBC W/AUTO DIFF WBC: CPT | Performed by: INTERNAL MEDICINE

## 2023-05-01 PROCEDURE — 99386 PREV VISIT NEW AGE 40-64: CPT | Performed by: INTERNAL MEDICINE

## 2023-05-01 ASSESSMENT — ENCOUNTER SYMPTOMS
PALPITATIONS: 0
ABDOMINAL PAIN: 0
EYE PAIN: 0
JOINT SWELLING: 0
SORE THROAT: 0
NAUSEA: 0
DIZZINESS: 0
DIARRHEA: 0
NERVOUS/ANXIOUS: 0
CHILLS: 0
PARESTHESIAS: 0
WEAKNESS: 0
HEADACHES: 0
HEARTBURN: 0
BREAST MASS: 0
DYSURIA: 0
MYALGIAS: 0
HEMATURIA: 0
SHORTNESS OF BREATH: 0
FEVER: 0
HEMATOCHEZIA: 0
ARTHRALGIAS: 0
FREQUENCY: 0
CONSTIPATION: 0
COUGH: 0

## 2023-05-01 NOTE — PROGRESS NOTES
The 10-year ASCVD risk score (Isael HERNANDEZ, et al., 2019) is: 2.1%    Values used to calculate the score:      Age: 52 years      Sex: Female      Is Non- : No      Diabetic: No      Tobacco smoker: No      Systolic Blood Pressure: 127 mmHg      Is BP treated: No      HDL Cholesterol: 35 mg/dL      Total Cholesterol: 200 mg/dL   SUBJECTIVE:   CC: Shireen is an 52 year old who presents for preventive health visit.        View : No data to display.              Patient has been advised of split billing requirements and indicates understanding: Yes  Healthy Habits:     Getting at least 3 servings of Calcium per day:  Yes    Bi-annual eye exam:  Yes    Dental care twice a year:  Yes    Sleep apnea or symptoms of sleep apnea:  None    Diet:  Regular (no restrictions)    Frequency of exercise:  2-3 days/week    Duration of exercise:  30-45 minutes    Taking medications regularly:  Yes    Medication side effects:  Not applicable    PHQ-2 Total Score: 0    Additional concerns today:  Yes              Today's PHQ-2 Score:       5/1/2023     8:21 AM   PHQ-2 ( 1999 [employer])   Q1: Little interest or pleasure in doing things 0   Q2: Feeling down, depressed or hopeless 0   PHQ-2 Score 0   Q1: Little interest or pleasure in doing things Not at all    Not at all   Q2: Feeling down, depressed or hopeless Not at all    Not at all   PHQ-2 Score 0    0       Have you ever done Advance Care Planning? (For example, a Health Directive, POLST, or a discussion with a medical provider or your loved ones about your wishes): No, advance care planning information given to patient to review.  Advanced care planning was discussed at today's visit.    Social History     Tobacco Use     Smoking status: Never     Smokeless tobacco: Never   Vaping Use     Vaping status: Never Used   Substance Use Topics     Alcohol use: Yes     Alcohol/week: 3.0 standard drinks of alcohol     Types: 3 Glasses of wine per week             5/1/2023      8:21 AM   Alcohol Use   Prescreen: >3 drinks/day or >7 drinks/week? No          View : No data to display.              Reviewed orders with patient.  Reviewed health maintenance and updated orders accordingly - Yes  Lab work is in process    Breast Cancer Screening:    FHS-7:       2/27/2023     8:21 AM   Breast CA Risk Assessment (FHS-7)   Did any of your first-degree relatives have breast or ovarian cancer? No   Did any of your relatives have bilateral breast cancer? Yes   Did any man in your family have breast cancer? No   Did any woman in your family have breast and ovarian cancer? No   Did any woman in your family have breast cancer before age 50 y? No   Do you have 2 or more relatives with breast and/or ovarian cancer? No   Do you have 2 or more relatives with breast and/or bowel cancer? No     click delete button to remove this line now  Mammogram Screening: Recommended annual mammography  Pertinent mammograms are reviewed under the imaging tab.    History of abnormal Pap smear: NO - age 30-65 PAP every 5 years with negative HPV co-testing recommended      7/30/2014    12:00 AM   PAP / HPV   PAP (Historical) NIL       Reviewed and updated as needed this visit by clinical staff   Tobacco  Allergies  Meds              Reviewed and updated as needed this visit by Provider                     Review of Systems   Constitutional: Negative for chills and fever.   HENT: Negative for congestion, ear pain, hearing loss and sore throat.    Eyes: Negative for pain and visual disturbance.   Respiratory: Negative for cough and shortness of breath.    Cardiovascular: Negative for chest pain, palpitations and peripheral edema.   Gastrointestinal: Negative for abdominal pain, constipation, diarrhea, heartburn, hematochezia and nausea.   Breasts:  Negative for tenderness, breast mass and discharge.   Genitourinary: Negative for dysuria, frequency, genital sores, hematuria, pelvic pain, urgency, vaginal bleeding and  vaginal discharge.   Musculoskeletal: Negative for arthralgias, joint swelling and myalgias.   Skin: Negative for rash.   Neurological: Negative for dizziness, weakness, headaches and paresthesias.   Psychiatric/Behavioral: Negative for mood changes. The patient is not nervous/anxious.           OBJECTIVE:   LMP 04/24/2023 (Approximate)   Physical Exam  GENERAL: healthy, alert and no distress  EYES: Eyes grossly normal to inspection, PERRL and conjunctivae and sclerae normal  HENT: ear canals and TM's normal, nose and mouth without ulcers or lesions  NECK: no adenopathy, no asymmetry, masses, or scars and thyroid normal to palpation  RESP: lungs clear to auscultation - no rales, rhonchi or wheezes  CV: regular rate and rhythm, normal S1 S2, no S3 or S4, no murmur, click or rub, no peripheral edema and peripheral pulses strong  MS: no gross musculoskeletal defects noted, no edema  SKIN: no suspicious lesions or rashes  NEURO: Normal strength and tone, mentation intact and speech normal  PSYCH: mentation appears normal, affect normal/bright    Diagnostic Test Results:  Labs reviewed in Epic    ASSESSMENT/PLAN:   (Z00.00) Routine general medical examination at a health care facility  (primary encounter diagnosis)  Comment: She is in good physical health  She goes to a gynecologist for Pap smears  I asked her to check with them to see when her next Pap smear is due  She had a colonoscopy in 2019  She needs 1 in 2024  She had extensive blood work done recently and it was normal  We did discuss about shingles and hepatitis B vaccines  Plan:     (I10) Benign essential hypertension  Comment: She was recently being evaluated for a transplant donor evaluation for her father  At that point her blood pressure was high  She even had ambulatory blood pressure recordings which were greater than 140 systolic and greater than 90 diastolic  She has been checking her pressures at home and even there sometimes the systolic is touching  "140  She wants to get treatment for this since she is going to donate her kidney to her dad  She is already doing therapeutic lifestyle changes  She is going to get back to me whether she wants to go on a diuretic/calcium channel blocker/lisinopril  We discussed the side effects of all these 3 medications  Plan:     (Z13.262) Screening for hyperlipidemia  Comment:   Plan: Lipid panel reflex to direct LDL Fasting              Patient has been advised of split billing requirements and indicates understanding: No      COUNSELING:  Reviewed preventive health counseling, as reflected in patient instructions       Healthy diet/nutrition       Vision screening       Immunizations    Declined: Zoster due to Other                Colorectal Cancer Screening      BMI:   Estimated body mass index is 27.64 kg/m  as calculated from the following:    Height as of 3/16/23: 1.626 m (5' 4\").    Weight as of 3/16/23: 73 kg (161 lb).         She reports that she has never smoked. She has never used smokeless tobacco.          Holden Correa MD  [facility]  "

## 2023-05-16 ENCOUNTER — MYC MEDICAL ADVICE (OUTPATIENT)
Dept: FAMILY MEDICINE | Facility: CLINIC | Age: 53
End: 2023-05-16
Payer: COMMERCIAL

## 2023-05-16 DIAGNOSIS — I10 BENIGN ESSENTIAL HYPERTENSION: Primary | ICD-10-CM

## 2023-05-17 RX ORDER — LOSARTAN POTASSIUM 25 MG/1
25 TABLET ORAL DAILY
Qty: 90 TABLET | Refills: 1 | Status: SHIPPED | OUTPATIENT
Start: 2023-05-17 | End: 2023-10-10 | Stop reason: DRUGHIGH

## 2023-10-10 ENCOUNTER — OFFICE VISIT (OUTPATIENT)
Dept: FAMILY MEDICINE | Facility: CLINIC | Age: 53
End: 2023-10-10
Payer: COMMERCIAL

## 2023-10-10 VITALS
RESPIRATION RATE: 17 BRPM | WEIGHT: 152.3 LBS | BODY MASS INDEX: 26 KG/M2 | DIASTOLIC BLOOD PRESSURE: 82 MMHG | HEART RATE: 67 BPM | HEIGHT: 64 IN | SYSTOLIC BLOOD PRESSURE: 129 MMHG | OXYGEN SATURATION: 97 % | TEMPERATURE: 96.9 F

## 2023-10-10 DIAGNOSIS — Z23 NEED FOR PROPHYLACTIC VACCINATION AND INOCULATION AGAINST INFLUENZA: ICD-10-CM

## 2023-10-10 DIAGNOSIS — I10 BENIGN ESSENTIAL HYPERTENSION: Primary | ICD-10-CM

## 2023-10-10 LAB
ANION GAP SERPL CALCULATED.3IONS-SCNC: 10 MMOL/L (ref 7–15)
BUN SERPL-MCNC: 10 MG/DL (ref 6–20)
CALCIUM SERPL-MCNC: 9.6 MG/DL (ref 8.6–10)
CHLORIDE SERPL-SCNC: 107 MMOL/L (ref 98–107)
CREAT SERPL-MCNC: 0.92 MG/DL (ref 0.51–0.95)
DEPRECATED HCO3 PLAS-SCNC: 26 MMOL/L (ref 22–29)
EGFRCR SERPLBLD CKD-EPI 2021: 75 ML/MIN/1.73M2
GLUCOSE SERPL-MCNC: 97 MG/DL (ref 70–99)
POTASSIUM SERPL-SCNC: 4.1 MMOL/L (ref 3.4–5.3)
SODIUM SERPL-SCNC: 143 MMOL/L (ref 135–145)

## 2023-10-10 PROCEDURE — 90471 IMMUNIZATION ADMIN: CPT | Performed by: PHYSICIAN ASSISTANT

## 2023-10-10 PROCEDURE — 90472 IMMUNIZATION ADMIN EACH ADD: CPT | Performed by: PHYSICIAN ASSISTANT

## 2023-10-10 PROCEDURE — 36415 COLL VENOUS BLD VENIPUNCTURE: CPT | Performed by: PHYSICIAN ASSISTANT

## 2023-10-10 PROCEDURE — 99213 OFFICE O/P EST LOW 20 MIN: CPT | Mod: 25 | Performed by: PHYSICIAN ASSISTANT

## 2023-10-10 PROCEDURE — 90682 RIV4 VACC RECOMBINANT DNA IM: CPT | Performed by: PHYSICIAN ASSISTANT

## 2023-10-10 PROCEDURE — 80048 BASIC METABOLIC PNL TOTAL CA: CPT | Performed by: PHYSICIAN ASSISTANT

## 2023-10-10 PROCEDURE — 90750 HZV VACC RECOMBINANT IM: CPT | Performed by: PHYSICIAN ASSISTANT

## 2023-10-10 RX ORDER — LOSARTAN POTASSIUM 100 MG/1
100 TABLET ORAL DAILY
Qty: 90 TABLET | Refills: 0 | Status: SHIPPED | OUTPATIENT
Start: 2023-10-10 | End: 2024-01-16

## 2023-10-10 ASSESSMENT — PAIN SCALES - GENERAL: PAINLEVEL: NO PAIN (0)

## 2023-10-10 NOTE — Clinical Note
Please abstract the following data from this visit with this patient into the appropriate field in Epic:  Tests that can be patient reported without a hard copy:  Pap smear done on this date: 01/2023  (approximately), by this group: obgyn and fertility, results were NIL .   Other Tests found in the patient's chart through Chart Review/Care Everywhere:  {Abstract Quality List (Optional):056206}  Note to Abstraction: If this section is blank, no results were found via Chart Review/Care Everywhere.

## 2023-10-10 NOTE — PATIENT INSTRUCTIONS
1500 mg sodium in a day  Increase losartan to 100 mg daily.   Follow up with us in one month   Omron cuff    Avoid pseudoephedrine

## 2023-10-10 NOTE — NURSING NOTE
Prior to immunization administration, verified patients identity using patient s name and date of birth. Please see Immunization Activity for additional information.     Screening Questionnaire for Adult Immunization    Are you sick today?   No   Do you have allergies to medications, food, a vaccine component or latex?   No   Have you ever had a serious reaction after receiving a vaccination?   No   Do you have a long-term health problem with heart, lung, kidney, or metabolic disease (e.g., diabetes), asthma, a blood disorder, no spleen, complement component deficiency, a cochlear implant, or a spinal fluid leak?  Are you on long-term aspirin therapy?   No   Do you have cancer, leukemia, HIV/AIDS, or any other immune system problem?   No   Do you have a parent, brother, or sister with an immune system problem?   No   In the past 3 months, have you taken medications that affect  your immune system, such as prednisone, other steroids, or anticancer drugs; drugs for the treatment of rheumatoid arthritis, Crohn s disease, or psoriasis; or have you had radiation treatments?   No   Have you had a seizure, or a brain or other nervous system problem?   No   During the past year, have you received a transfusion of blood or blood    products, or been given immune (gamma) globulin or antiviral drug?   No   For women: Are you pregnant or is there a chance you could become       pregnant during the next month?   No   Have you received any vaccinations in the past 4 weeks?   No     Immunization questionnaire answers were all negative.      Patient instructed to remain in clinic for 15 minutes afterwards, and to report any adverse reactions.     Screening performed by Claritza Ceballos MA on 10/10/2023 at 8:40 AM.

## 2023-10-10 NOTE — PROGRESS NOTES
"  Assessment & Plan     Benign essential hypertension  Blood pressures at  home have not always been at goal.   Increase losartan from 50 mg to 100 mg and follow up with us in one month  - losartan (COZAAR) 100 MG tablet  Dispense: 90 tablet; Refill: 0  - Basic metabolic panel  (Ca, Cl, CO2, Creat, Gluc, K, Na, BUN)  - Basic metabolic panel  (Ca, Cl, CO2, Creat, Gluc, K, Na, BUN)    Need for prophylactic vaccination and inoculation against influenza  Flu shot given today  Interested in shingrix as well . Declines covid booster       Ordering of each unique test  Prescription drug management         BMI:   Estimated body mass index is 26.14 kg/m  as calculated from the following:    Height as of this encounter: 1.626 m (5' 4\").    Weight as of this encounter: 69.1 kg (152 lb 4.8 oz).   Weight management plan: Discussed healthy diet and exercise guidelines    Patient Instructions   1500 mg sodium in a day  Increase losartan to 100 mg daily.   Follow up with us in one month   Omron cuff    Avoid pseudoephedrine     Hailee Winter PA-C  [facility]    Lisa Iyer is a 53 year old, presenting for the following health issues:  Medication Follow-up (Losartan )        10/10/2023     8:11 AM   Additional Questions   Roomed by Claritza BRIGHT   Accompanied by self       History of Present Illness       Hypertension: She presents for follow up of hypertension.  She does check blood pressure  regularly outside of the clinic. Outside blood pressures have been over 140/90. She does not follow a low salt diet.     She eats 2-3 servings of fruits and vegetables daily.She consumes 1 sweetened beverage(s) daily.She exercises with enough effort to increase her heart rate 20 to 29 minutes per day.  She exercises with enough effort to increase her heart rate 3 or less days per week. She is missing 2 dose(s) of medications per week.  She is not taking prescribed medications regularly due to remembering to " "take.         Medication Followup of losartan  Taking Medication as prescribed: yes  Side Effects:  None  Medication Helping Symptoms:  not sure     Patient new to me with history of hypertension presents for recheck hypertension.  Exercises Once or twice a week exercise -jogging  On 50 mg Losartan 50 mg since mid August (almost two months)  Generally moderate salt intake. In last day or two not as much  Tries to be careful - not add as much salt.  Eats out periodically   Snack tortilla chips and salsa.   Still getting diastolic numbers 85-90 and above- wonders about increasing dose   No side effects with losartan  Pap smear - obgyn and fertility- iud February and only spotting no menses-believes pap in January  Camping with daughter (13 year old) this weekend   Review of Systems   Constitutional, HEENT, cardiovascular, pulmonary, gi and gu systems are negative, except as otherwise noted.      Objective    /82 (BP Location: Right arm, Patient Position: Sitting, Cuff Size: Adult Regular)   Pulse 67   Temp 96.9  F (36.1  C) (Temporal)   Resp 17   Ht 1.626 m (5' 4\")   Wt 69.1 kg (152 lb 4.8 oz)   LMP 10/02/2023 (Approximate)   SpO2 97%   BMI 26.14 kg/m    Body mass index is 26.14 kg/m .  Physical Exam   GENERAL: healthy, alert and no distress  NECK: no adenopathy, no asymmetry, masses, or scars and thyroid normal to palpation  RESP: lungs clear to auscultation - no rales, rhonchi or wheezes  CV: regular rate and rhythm, normal S1 S2, no S3 or S4, no murmur, click or rub, no peripheral edema and peripheral pulses strong  MS: no gross musculoskeletal defects noted, no edema                      "

## 2023-10-11 NOTE — RESULT ENCOUNTER NOTE
Dear Shireen  Your electrolytes, blood sugar and kidney function were normal.   Please call or MyChart my office with any questions or concerns.   Hailee Winter, PAC

## 2024-01-15 DIAGNOSIS — I10 BENIGN ESSENTIAL HYPERTENSION: ICD-10-CM

## 2024-01-16 RX ORDER — LOSARTAN POTASSIUM 100 MG/1
100 TABLET ORAL DAILY
Qty: 90 TABLET | Refills: 0 | Status: SHIPPED | OUTPATIENT
Start: 2024-01-16 | End: 2024-05-10

## 2024-03-11 ENCOUNTER — ANCILLARY PROCEDURE (OUTPATIENT)
Dept: MAMMOGRAPHY | Facility: CLINIC | Age: 54
End: 2024-03-11
Attending: OBSTETRICS & GYNECOLOGY
Payer: COMMERCIAL

## 2024-03-11 DIAGNOSIS — Z12.31 ENCOUNTER FOR SCREENING MAMMOGRAM FOR BREAST CANCER: ICD-10-CM

## 2024-03-11 PROCEDURE — 77067 SCR MAMMO BI INCL CAD: CPT | Mod: GC

## 2024-03-11 PROCEDURE — 77063 BREAST TOMOSYNTHESIS BI: CPT | Mod: GC

## 2024-05-10 DIAGNOSIS — I10 BENIGN ESSENTIAL HYPERTENSION: ICD-10-CM

## 2024-05-10 RX ORDER — LOSARTAN POTASSIUM 100 MG/1
100 TABLET ORAL DAILY
Qty: 90 TABLET | Refills: 0 | Status: SHIPPED | OUTPATIENT
Start: 2024-05-10

## 2024-06-22 ENCOUNTER — HEALTH MAINTENANCE LETTER (OUTPATIENT)
Age: 54
End: 2024-06-22

## 2025-05-09 ENCOUNTER — ANCILLARY PROCEDURE (OUTPATIENT)
Dept: MAMMOGRAPHY | Facility: CLINIC | Age: 55
End: 2025-05-09
Payer: COMMERCIAL

## 2025-05-09 DIAGNOSIS — Z12.31 VISIT FOR SCREENING MAMMOGRAM: ICD-10-CM

## 2025-05-09 PROCEDURE — 77067 SCR MAMMO BI INCL CAD: CPT | Performed by: RADIOLOGY

## 2025-05-09 PROCEDURE — 77063 BREAST TOMOSYNTHESIS BI: CPT | Performed by: RADIOLOGY

## 2025-05-26 ENCOUNTER — PATIENT OUTREACH (OUTPATIENT)
Dept: CARE COORDINATION | Facility: CLINIC | Age: 55
End: 2025-05-26
Payer: COMMERCIAL

## 2025-05-28 ENCOUNTER — PATIENT OUTREACH (OUTPATIENT)
Dept: CARE COORDINATION | Facility: CLINIC | Age: 55
End: 2025-05-28
Payer: COMMERCIAL

## 2025-06-12 ENCOUNTER — PRE VISIT (OUTPATIENT)
Dept: SURGERY | Facility: CLINIC | Age: 55
End: 2025-06-12
Payer: COMMERCIAL

## 2025-06-12 NOTE — CONFIDENTIAL NOTE
COLON AND RECTAL SURGERY PRE-VISIT:  Diagnosis, Referred by & from: Hemorrhoids.   Appt date: 7/2/2025 with MALI Miller at [facility]     Records Requested       Record  Facility Outcome:   [facility]   Fax:  [phone]  06/12/25 at 5:51 PM:  Requested.     06/17/25 at 4:04 PM:  Received.    Complete [x]       Dina Pierce, NREMT  Colon and Rectal Surgery

## 2025-06-13 ENCOUNTER — MEDICAL CORRESPONDENCE (OUTPATIENT)
Dept: HEALTH INFORMATION MANAGEMENT | Facility: CLINIC | Age: 55
End: 2025-06-13
Payer: COMMERCIAL

## 2025-06-13 ENCOUNTER — TRANSFERRED RECORDS (OUTPATIENT)
Dept: HEALTH INFORMATION MANAGEMENT | Facility: CLINIC | Age: 55
End: 2025-06-13
Payer: COMMERCIAL

## 2025-06-18 ENCOUNTER — OFFICE VISIT (OUTPATIENT)
Dept: FAMILY MEDICINE | Facility: CLINIC | Age: 55
End: 2025-06-18
Payer: COMMERCIAL

## 2025-06-18 VITALS
BODY MASS INDEX: 27.33 KG/M2 | RESPIRATION RATE: 16 BRPM | WEIGHT: 160.1 LBS | HEART RATE: 71 BPM | DIASTOLIC BLOOD PRESSURE: 80 MMHG | SYSTOLIC BLOOD PRESSURE: 119 MMHG | OXYGEN SATURATION: 98 % | HEIGHT: 64 IN | TEMPERATURE: 98.8 F

## 2025-06-18 DIAGNOSIS — I10 BENIGN ESSENTIAL HYPERTENSION: Primary | ICD-10-CM

## 2025-06-18 DIAGNOSIS — E78.5 HYPERLIPIDEMIA LDL GOAL <100: ICD-10-CM

## 2025-06-18 DIAGNOSIS — Z12.11 COLON CANCER SCREENING: ICD-10-CM

## 2025-06-18 PROCEDURE — 3079F DIAST BP 80-89 MM HG: CPT | Performed by: INTERNAL MEDICINE

## 2025-06-18 PROCEDURE — 3074F SYST BP LT 130 MM HG: CPT | Performed by: INTERNAL MEDICINE

## 2025-06-18 PROCEDURE — 99214 OFFICE O/P EST MOD 30 MIN: CPT | Performed by: INTERNAL MEDICINE

## 2025-06-18 PROCEDURE — 1126F AMNT PAIN NOTED NONE PRSNT: CPT | Performed by: INTERNAL MEDICINE

## 2025-06-18 RX ORDER — LOSARTAN POTASSIUM 25 MG/1
25 TABLET ORAL DAILY
Qty: 90 TABLET | Refills: 3 | Status: SHIPPED | OUTPATIENT
Start: 2025-06-18

## 2025-06-18 RX ORDER — AMLODIPINE BESYLATE 5 MG/1
5 TABLET ORAL DAILY
Qty: 90 TABLET | Refills: 3 | Status: SHIPPED | OUTPATIENT
Start: 2025-06-18

## 2025-06-18 RX ORDER — ATORVASTATIN CALCIUM 10 MG/1
10 TABLET, FILM COATED ORAL DAILY
Qty: 90 TABLET | Refills: 3 | Status: SHIPPED | OUTPATIENT
Start: 2025-06-18

## 2025-06-18 ASSESSMENT — PAIN SCALES - GENERAL: PAINLEVEL_OUTOF10: NO PAIN (0)

## 2025-06-18 NOTE — PROGRESS NOTES
"  Assessment & Plan     Benign essential hypertension  Blood pressure under good control with the current regimen of losartan and amlodipine  She does have pedal edema at times but it is very minimal  - amLODIPine (NORVASC) 5 MG tablet; Take 1 tablet (5 mg) by mouth daily.  - losartan (COZAAR) 25 MG tablet; Take 1 tablet (25 mg) by mouth daily.    Hyperlipidemia LDL goal <100  Her CT coronary calcium score is 0  I discussed with her that because her CT coronary calcium score is 0 it is okay for us to aim for a LDL level of below 100 but obviously if it is close to 70 will be good  - atorvastatin (LIPITOR) 10 MG tablet; Take 1 tablet (10 mg) by mouth daily.  - Lipid Profile (Chol, Trig, HDL, LDL calc); Future    Colon cancer screening  With a colonoscopy in 2019 and the recommended repeat colonoscopy in 2029 however she tells me that she has a family history of colon cancer and in the past 1 provide recommended to her that she should have colonoscopy every 5 years  She wants a referral for colonoscopy but I explained to her that she has to check with the insurance and get their prior authorization before going ahead with the colonoscopy since and the health maintenance satisfaction orders at [facility] it is flagged that her next colonoscopy is in 2029 as that is what her last gastroenterologist wanted colonoscopy recommended  - Colonoscopy Screening  Referral; Future          BMI  Estimated body mass index is 27.48 kg/m  as calculated from the following:    Height as of this encounter: 1.626 m (5' 4\").    Weight as of this encounter: 72.6 kg (160 lb 1.6 oz).             Lisa Iyer is a 54 year old, presenting for the following health issues:  Recheck Medication (Refills)        6/18/2025     1:12 PM   Additional Questions   Roomed by Marvin   Accompanied by Self     History of Present Illness       Hypertension: She presents for follow up of hypertension.  She does check blood pressure  regularly " "outside of the clinic. Outpatient blood pressures have not been over 140/90. She follows a low salt diet.     She eats 4 or more servings of fruits and vegetables daily.She consumes 1 sweetened beverage(s) daily.She exercises with enough effort to increase her heart rate 30 to 60 minutes per day.  She exercises with enough effort to increase her heart rate 3 or less days per week. She is missing 1 dose(s) of medications per week.  She is not taking prescribed medications regularly due to remembering to take.                  Review of Systems  Constitutional, HEENT, cardiovascular, pulmonary, gi and gu systems are negative, except as otherwise noted.      Objective    /80 (BP Location: Right arm, Patient Position: Sitting, Cuff Size: Adult Regular)   Pulse 71   Temp 98.8  F (37.1  C) (Tympanic)   Resp 16   Ht 1.626 m (5' 4\")   Wt 72.6 kg (160 lb 1.6 oz)   SpO2 98%   BMI 27.48 kg/m    Body mass index is 27.48 kg/m .  Physical Exam   GENERAL: alert and no distress  EYES: Eyes grossly normal to inspection, PERRL and conjunctivae and sclerae normal  RESP: lungs clear to auscultation - no rales, rhonchi or wheezes  CV: regular rate and rhythm, normal S1 S2, no S3 or S4, no murmur, click or rub, no peripheral edema  MS: no gross musculoskeletal defects noted, no edema            Signed Electronically by: Holden Correa MD    "

## 2025-06-25 ENCOUNTER — MYC MEDICAL ADVICE (OUTPATIENT)
Dept: GASTROENTEROLOGY | Facility: CLINIC | Age: 55
End: 2025-06-25
Payer: COMMERCIAL

## 2025-07-02 ENCOUNTER — OFFICE VISIT (OUTPATIENT)
Dept: SURGERY | Facility: CLINIC | Age: 55
End: 2025-07-02
Attending: OBSTETRICS & GYNECOLOGY
Payer: COMMERCIAL

## 2025-07-02 VITALS
SYSTOLIC BLOOD PRESSURE: 122 MMHG | OXYGEN SATURATION: 100 % | HEIGHT: 64 IN | HEART RATE: 75 BPM | DIASTOLIC BLOOD PRESSURE: 83 MMHG | RESPIRATION RATE: 16 BRPM | WEIGHT: 161.3 LBS | BODY MASS INDEX: 27.54 KG/M2

## 2025-07-02 DIAGNOSIS — K64.4 EXTERNAL HEMORRHOID: ICD-10-CM

## 2025-07-02 DIAGNOSIS — K64.8 INTERNAL HEMORRHOIDS: Primary | ICD-10-CM

## 2025-07-02 ASSESSMENT — PAIN SCALES - GENERAL: PAINLEVEL_OUTOF10: NO PAIN (0)

## 2025-07-02 NOTE — NURSING NOTE
"Chief Complaint   Patient presents with    New Patient     Hemorrhoids       Vitals:    07/02/25 1326   BP: 122/83   Pulse: 75   Resp: 16   SpO2: 100%   Weight: 73.2 kg (161 lb 4.8 oz)   Height: 1.626 m (5' 4\")       Body mass index is 27.69 kg/m .     JOCELINE Brown LPN  "

## 2025-07-02 NOTE — PATIENT INSTRUCTIONS
Start a daily fiber supplement such as Citrucel or Metamucil POWDER. Start with 2 tablespoons daily     Try to have daily fiber intake 25-30 grams.     Follow up as needed/reach out via [facility]

## 2025-07-02 NOTE — PROGRESS NOTES
"Colon and Rectal Surgery Consult Clinic Note    Referring provider:  Faye Escoto MD  [address] [place] [address]     Patient: Shireen Bautista  YOB: 1970  Date of Visit: 7/2/2025    Shireen Bautista is a very pleasant 54 year old female here with concerns for anal skin tag. Patient states she got hemorrhoids after her pregnancy with her twins and now has this residual skin tag. This does not bleed but can be painful during intercourse and makes hygiene difficult. She tends to have more loose stools - she feels this is influenced by diet. For the most part she has soft formed stools at least once daily, at times will have more frequent stools. Denies any tissue prolapse that she has to reduce.     Last colonoscopy 9/19/2019 for chronic diarrhea (mother with advanced adenoma)- 10year recall  Impression:               - Hemorrhoids found on perianal exam.                             - Two 4 to 6 mm polyps in the rectum, removed with                             a cold snare. Resected and retrieved.                             - There was significant looping of the colon.                             - Internal hemorrhoids.                             - Biopsies were taken with a cold forceps from the                             entire colon for evaluation of microscopic colitis.   PATHOLOGY:  FINAL DIAGNOSIS:   A. COLON BIOPSY, RANDOM:   - Colonic mucosa with no significant histologic abnormality   - No evidence of microscopic colitis     B. RECTAL POLYPS x2:   - Hyperplastic polyp x1   - One fragment of colonic mucosa with no significant histologic   abnormality     No  was used for this encounter.    Physical Examination:  /83   Pulse 75   Resp 16   Ht 5' 4\"   Wt 161 lb 4.8 oz   SpO2 100%   BMI 27.69 kg/m    General: alert, oriented, in no acute distress, sitting comfortably  Respiratory: non-labored breathing on RA  Chaperone: Nery FIGUEROAN  Perianal External " Examination:  Perianal skin: Intact with no excoriation or lichenification.  Lesions: No evidence of an external lesion, nodularity, or induration in the perianal region.  Eversion of buttocks: There was not evidence of an anal fissure. Details: N/A.  Skin tags or external hemorrhoids: Yes: hemorrhoidal skin tag in anterior midline and left lateral.    Digital Rectal Examination: Was performed.   Sphincter tone: Good.  Palpable lesions: No.    Anoscopy: Was performed.   Hemorrhoids: small internal hemorrhoids.  Lesions: No.    Assessment/Plan: Shireen Bautista is a 54 year old female with hemorrhoidal skin tag that is symptomatic. We reviewed EUA, hemorrhoidectomy with excision of hemorrhoidal skin tag in terms of procedure, recovery, risks and benefits. We reviewed postoperative complications including but not limited to: infection, bleeding. Patient would like time to consider this.     Advised to increase daily fiber intake. Aim for 25-30 grams of fiber a day. Can use fiber supplement such as metamucil or citrucel. Follow up as needed.        No past medical history on file.  Past Surgical History:   Procedure Laterality Date    ABDOMINOPLASTY  2012     SECTION  , ,     COLONOSCOPY N/A 2019    Procedure: Colonoscopy, With Polypectomy And Biopsy;  Surgeon: Shahrzad Herring DO;  Location: [facility] OR    COLONOSCOPY WITH CO2 INSUFFLATION N/A 2019    Procedure: COLONOSCOPY, WITH CO2 INSUFFLATION;  Surgeon: Shahrzad Herring DO;  Location: [facility] OR    LAPAROSCOPIC CHOLECYSTECTOMY N/A 2019    Procedure: CHOLECYSTECTOMY, LAPAROSCOPIC;  Surgeon: Monty Sears MD;  Location:  OR    LAPAROSCOPY DIAGNOSTIC (GENERAL)  , ?2007    endmetriosis    TUBAL LIGATION      with c/s     Current Outpatient Medications   Medication Sig Dispense Refill    amLODIPine (NORVASC) 5 MG tablet Take 1 tablet (5 mg) by mouth daily. 90 tablet 3    atorvastatin (LIPITOR) 10 MG tablet Take 1 tablet (10  mg) by mouth daily. 90 tablet 3    cetirizine (ZYRTEC) 10 MG tablet Take 10 mg by mouth daily as needed for allergies      losartan (COZAAR) 25 MG tablet Take 1 tablet (25 mg) by mouth daily. 90 tablet 3    multivitamin w/minerals (MULTI-VITAMIN) tablet Take 1 tablet by mouth daily       No Known Allergies  Family History   Problem Relation Age of Onset    Lipids Mother     Hypertension Mother     Coronary Artery Disease Father     Diabetes Father     Lipids Father     Chronic Kidney Disease Father     Hypertension Brother     Cardiovascular Maternal Grandmother         CHF    Unknown/Adopted Maternal Grandfather     Unknown/Adopted Paternal Grandfather     Celiac Disease Daughter     Asthma No family hx of     C.A.D. No family hx of     Thyroid Disease No family hx of      Social History     Tobacco Use    Smoking status: Never    Smokeless tobacco: Never   Substance Use Topics    Alcohol use: Yes     Alcohol/week: 3.0 standard drinks of alcohol     Types: 3 Glasses of wine per week    Marital status: .    Danette Miller PA-C  Colon and Rectal Surgery   [facility]      No LOS data to display  Time spent on date of encounter doing chart review, history and exam, documentation, and further activities in this note.

## 2025-07-02 NOTE — LETTER
7/2/2025      Shireen Bautista  [address] [address]      Dear Colleague,    Thank you for referring your patient, Shireen Bautista, to the [facility] [place]. Please see a copy of my visit note below.    Colon and Rectal Surgery Consult Clinic Note    Referring provider:  Faye Escoto MD  [address] [place] [address]     Patient: Shireen Bautista  YOB: 1970  Date of Visit: 7/2/2025    Shireen Bautista is a very pleasant 54 year old female here with concerns for anal skin tag. Patient states she got hemorrhoids after her pregnancy with her twins and now has this residual skin tag. This does not bleed but can be painful during intercourse and makes hygiene difficult. She tends to have more loose stools - she feels this is influenced by diet. For the most part she has soft formed stools at least once daily, at times will have more frequent stools. Denies any tissue prolapse that she has to reduce.     Last colonoscopy 9/19/2019 for chronic diarrhea (mother with advanced adenoma)- 10year recall  Impression:               - Hemorrhoids found on perianal exam.                             - Two 4 to 6 mm polyps in the rectum, removed with                             a cold snare. Resected and retrieved.                             - There was significant looping of the colon.                             - Internal hemorrhoids.                             - Biopsies were taken with a cold forceps from the                             entire colon for evaluation of microscopic colitis.   PATHOLOGY:  FINAL DIAGNOSIS:   A. COLON BIOPSY, RANDOM:   - Colonic mucosa with no significant histologic abnormality   - No evidence of microscopic colitis     B. RECTAL POLYPS x2:   - Hyperplastic polyp x1   - One fragment of colonic mucosa with no significant histologic   abnormality     No  was used for this encounter.    Physical Examination:  BP  "122/83   Pulse 75   Resp 16   Ht 5' 4\"   Wt 161 lb 4.8 oz   SpO2 100%   BMI 27.69 kg/m    General: alert, oriented, in no acute distress, sitting comfortably  Respiratory: non-labored breathing on RA  Chaperone: Nery MCHUGH  Perianal External Examination:  Perianal skin: Intact with no excoriation or lichenification.  Lesions: No evidence of an external lesion, nodularity, or induration in the perianal region.  Eversion of buttocks: There was not evidence of an anal fissure. Details: N/A.  Skin tags or external hemorrhoids: Yes: hemorrhoidal skin tag in anterior midline and left lateral.    Digital Rectal Examination: Was performed.   Sphincter tone: Good.  Palpable lesions: No.    Anoscopy: Was performed.   Hemorrhoids: small internal hemorrhoids.  Lesions: No.    Assessment/Plan: Shireen Bautista is a 54 year old female with hemorrhoidal skin tag that is symptomatic. We reviewed EUA, hemorrhoidectomy with excision of hemorrhoidal skin tag in terms of procedure, recovery, risks and benefits. We reviewed postoperative complications including but not limited to: infection, bleeding. Patient would like time to consider this.     Advised to increase daily fiber intake. Aim for 25-30 grams of fiber a day. Can use fiber supplement such as metamucil or citrucel. Follow up as needed.        No past medical history on file.  Past Surgical History:   Procedure Laterality Date     ABDOMINOPLASTY  2012      SECTION  , ,      COLONOSCOPY N/A 2019    Procedure: Colonoscopy, With Polypectomy And Biopsy;  Surgeon: Shahrzad Herring DO;  Location: [facility] OR     COLONOSCOPY WITH CO2 INSUFFLATION N/A 2019    Procedure: COLONOSCOPY, WITH CO2 INSUFFLATION;  Surgeon: Shahrzad Herring DO;  Location: [facility] OR     LAPAROSCOPIC CHOLECYSTECTOMY N/A 2019    Procedure: CHOLECYSTECTOMY, LAPAROSCOPIC;  Surgeon: Monty Sears MD;  Location:  OR     LAPAROSCOPY DIAGNOSTIC (GENERAL)  , ?    " endmetriosis     TUBAL LIGATION  2010    with c/s     Current Outpatient Medications   Medication Sig Dispense Refill     amLODIPine (NORVASC) 5 MG tablet Take 1 tablet (5 mg) by mouth daily. 90 tablet 3     atorvastatin (LIPITOR) 10 MG tablet Take 1 tablet (10 mg) by mouth daily. 90 tablet 3     cetirizine (ZYRTEC) 10 MG tablet Take 10 mg by mouth daily as needed for allergies       losartan (COZAAR) 25 MG tablet Take 1 tablet (25 mg) by mouth daily. 90 tablet 3     multivitamin w/minerals (MULTI-VITAMIN) tablet Take 1 tablet by mouth daily       No Known Allergies  Family History   Problem Relation Age of Onset     Lipids Mother      Hypertension Mother      Coronary Artery Disease Father      Diabetes Father      Lipids Father      Chronic Kidney Disease Father      Hypertension Brother      Cardiovascular Maternal Grandmother         CHF     Unknown/Adopted Maternal Grandfather      Unknown/Adopted Paternal Grandfather      Celiac Disease Daughter      Asthma No family hx of      C.A.D. No family hx of      Thyroid Disease No family hx of      Social History     Tobacco Use     Smoking status: Never     Smokeless tobacco: Never   Substance Use Topics     Alcohol use: Yes     Alcohol/week: 3.0 standard drinks of alcohol     Types: 3 Glasses of wine per week    Marital status: .    Danette Miller PA-C  Colon and Rectal Surgery   [facility]      No LOS data to display  Time spent on date of encounter doing chart review, history and exam, documentation, and further activities in this note.     Again, thank you for allowing me to participate in the care of your patient.        Sincerely,        Danette Miller PA-C    Electronically signed

## 2025-07-12 ENCOUNTER — HEALTH MAINTENANCE LETTER (OUTPATIENT)
Age: 55
End: 2025-07-12

## (undated) DEVICE — SU VICRYL 4-0 PS-2 18" UND J496H

## (undated) DEVICE — PREP CHLORAPREP 26ML TINTED ORANGE  260815

## (undated) DEVICE — ESU GROUND PAD UNIVERSAL W/O CORD

## (undated) DEVICE — GLOVE PROTEXIS BLUE W/NEU-THERA 7.5  2D73EB75

## (undated) DEVICE — ENDO POUCH UNIV RETRIEVAL SYSTEM INZII 10MM CD001

## (undated) DEVICE — SU VICRYL 0 UR-6 27" J603H

## (undated) DEVICE — SOL WATER IRRIG 1000ML BOTTLE 2F7114

## (undated) DEVICE — SUCTION IRR STRYKERFLOW II W/TIP 250-070-520

## (undated) DEVICE — SOL NACL 0.9% INJ 1000ML BAG 2B1324X

## (undated) DEVICE — LINEN TOWEL PACK X5 5464

## (undated) DEVICE — ENDO TROCAR SLEEVE KII Z-THREADED 05X100MM CTS02

## (undated) DEVICE — DEVICE SUTURE GRASPER TROCAR CLOSURE 14GA PMITCSG

## (undated) DEVICE — KIT ENDO FIRST STEP DISINFECTANT 200ML W/POUCH EP-4

## (undated) DEVICE — PAD CHUX UNDERPAD 23X24" 7136

## (undated) DEVICE — CLIP APPLIER ENDO 5MM M/L LIGAMAX EL5ML

## (undated) DEVICE — SYSTEM CLEARIFY VISUALIZATION 21-345

## (undated) DEVICE — PACK LAP CHOLE SLC15LCFSD

## (undated) DEVICE — ENDO TROCAR FIRST ENTRY KII FIOS Z-THRD 05X100MM CTF03

## (undated) DEVICE — ENDO TROCAR FIRST ENTRY KII FIOS Z-THRD 11X100MM CTF33

## (undated) DEVICE — ESU HOLDER LAP INST DISP PURPLE LONG 330MM H-PRO-330

## (undated) DEVICE — SYR 50ML LL W/O NDL 309653

## (undated) DEVICE — MANIFOLD NEPTUNE 4 PORT 700-20

## (undated) DEVICE — GLOVE PROTEXIS W/NEU-THERA 7.5  2D73TE75

## (undated) RX ORDER — KETOROLAC TROMETHAMINE 30 MG/ML
INJECTION, SOLUTION INTRAMUSCULAR; INTRAVENOUS
Status: DISPENSED
Start: 2019-05-08

## (undated) RX ORDER — PROPOFOL 10 MG/ML
INJECTION, EMULSION INTRAVENOUS
Status: DISPENSED
Start: 2019-05-08

## (undated) RX ORDER — FENTANYL CITRATE 50 UG/ML
INJECTION, SOLUTION INTRAMUSCULAR; INTRAVENOUS
Status: DISPENSED
Start: 2019-05-08

## (undated) RX ORDER — LIDOCAINE HYDROCHLORIDE 20 MG/ML
INJECTION, SOLUTION EPIDURAL; INFILTRATION; INTRACAUDAL; PERINEURAL
Status: DISPENSED
Start: 2019-05-08

## (undated) RX ORDER — ONDANSETRON 2 MG/ML
INJECTION INTRAMUSCULAR; INTRAVENOUS
Status: DISPENSED
Start: 2019-05-08

## (undated) RX ORDER — CEFAZOLIN SODIUM 2 G/100ML
INJECTION, SOLUTION INTRAVENOUS
Status: DISPENSED
Start: 2019-05-08

## (undated) RX ORDER — HYDROMORPHONE HYDROCHLORIDE 1 MG/ML
INJECTION, SOLUTION INTRAMUSCULAR; INTRAVENOUS; SUBCUTANEOUS
Status: DISPENSED
Start: 2019-05-08

## (undated) RX ORDER — BUPIVACAINE HYDROCHLORIDE AND EPINEPHRINE 5; 5 MG/ML; UG/ML
INJECTION, SOLUTION EPIDURAL; INTRACAUDAL; PERINEURAL
Status: DISPENSED
Start: 2019-05-08

## (undated) RX ORDER — DEXAMETHASONE SODIUM PHOSPHATE 4 MG/ML
INJECTION, SOLUTION INTRA-ARTICULAR; INTRALESIONAL; INTRAMUSCULAR; INTRAVENOUS; SOFT TISSUE
Status: DISPENSED
Start: 2019-05-08

## (undated) RX ORDER — NEOSTIGMINE METHYLSULFATE 1 MG/ML
VIAL (ML) INJECTION
Status: DISPENSED
Start: 2019-05-08

## (undated) RX ORDER — SIMETHICONE 40MG/0.6ML
SUSPENSION, DROPS(FINAL DOSAGE FORM)(ML) ORAL
Status: DISPENSED
Start: 2019-09-19

## (undated) RX ORDER — FENTANYL CITRATE 50 UG/ML
INJECTION, SOLUTION INTRAMUSCULAR; INTRAVENOUS
Status: DISPENSED
Start: 2019-09-19

## (undated) RX ORDER — GLYCOPYRROLATE 0.2 MG/ML
INJECTION, SOLUTION INTRAMUSCULAR; INTRAVENOUS
Status: DISPENSED
Start: 2019-05-08

## (undated) RX ORDER — LIDOCAINE HYDROCHLORIDE 10 MG/ML
INJECTION, SOLUTION EPIDURAL; INFILTRATION; INTRACAUDAL; PERINEURAL
Status: DISPENSED
Start: 2019-05-08